# Patient Record
Sex: FEMALE | Race: WHITE | URBAN - METROPOLITAN AREA
[De-identification: names, ages, dates, MRNs, and addresses within clinical notes are randomized per-mention and may not be internally consistent; named-entity substitution may affect disease eponyms.]

---

## 2020-06-01 ENCOUNTER — OFFICE VISIT (OUTPATIENT)
Dept: URBAN - METROPOLITAN AREA CLINIC 79 | Facility: CLINIC | Age: 60
End: 2020-06-01
Payer: COMMERCIAL

## 2020-06-01 DIAGNOSIS — K50.80 CROHN'S DISEASE OF BOTH SMALL AND LARGE INTESTINE: ICD-10-CM

## 2020-06-01 PROCEDURE — 96413 CHEMO IV INFUSION 1 HR: CPT | Performed by: INTERNAL MEDICINE

## 2020-06-01 PROCEDURE — 96415 CHEMO IV INFUSION ADDL HR: CPT | Performed by: INTERNAL MEDICINE

## 2020-06-01 RX ORDER — AMITRIPTYLINE HYDROCHLORIDE 25 MG/1
TAKE 1 TABLET (25 MG) BY ORAL ROUTE ONCE DAILY AT BEDTIME TABLET, FILM COATED ORAL 1
Qty: 0 | Refills: 0 | Status: ACTIVE | COMMUNITY
Start: 1900-01-01 | End: 1900-01-01

## 2020-06-01 RX ORDER — RIZATRIPTAN BENZOATE 10 MG
PRN MIGRAINES TABLET ORAL
Qty: 0 | Refills: 0 | Status: ACTIVE | COMMUNITY
Start: 1900-01-01 | End: 1900-01-01

## 2020-06-01 RX ORDER — MESALAMINE 800 MG/1
TAKE 3 TABLETS BY ORAL ROUTE 2 TIMES A DAY FOR 90 DAYS TABLET, DELAYED RELEASE ORAL 2
Qty: 540 | Refills: 3 | Status: ACTIVE | COMMUNITY
Start: 2020-02-05 | End: 2021-01-30

## 2020-06-01 RX ORDER — FAMOTIDINE 40 MG/1
TAKE 1 TABLET (40 MG) BY ORAL ROUTE 2 TIMES PER DAY FOR 90 DAYS TABLET ORAL 2
Qty: 180 | Refills: 3 | Status: ACTIVE | COMMUNITY
Start: 2019-11-10 | End: 2020-11-04

## 2020-06-01 RX ORDER — LEVOTHYROXINE SODIUM 0.05 MG/1
TABLET ORAL
Qty: 0 | Refills: 0 | Status: ACTIVE | COMMUNITY
Start: 1900-01-01 | End: 1900-01-01

## 2020-06-01 RX ORDER — METOPROLOL SUCCINATE 50 MG/1
TAKE 1 TABLET (50 MG) BY ORAL ROUTE ONCE DAILY TABLET, EXTENDED RELEASE ORAL 1
Qty: 0 | Refills: 0 | Status: ACTIVE | COMMUNITY
Start: 1900-01-01 | End: 1900-01-01

## 2020-06-01 RX ORDER — COLESEVELAM HYDROCHLORIDE 625 MG/1
TAKE 3 TABLETS (1,875 MG) BY ORAL ROUTE 2 TIMES PER DAY WITH MEALS AND LIQUID FOR 90 DAYS TABLET, FILM COATED ORAL 2
Qty: 540 | Refills: 3 | Status: ACTIVE | COMMUNITY
Start: 2020-02-05 | End: 2021-01-30

## 2020-07-13 ENCOUNTER — OFFICE VISIT (OUTPATIENT)
Dept: URBAN - METROPOLITAN AREA CLINIC 79 | Facility: CLINIC | Age: 60
End: 2020-07-13
Payer: COMMERCIAL

## 2020-07-13 DIAGNOSIS — K50.80 CROHN'S COLITIS: ICD-10-CM

## 2020-07-13 PROCEDURE — 96413 CHEMO IV INFUSION 1 HR: CPT | Performed by: INTERNAL MEDICINE

## 2020-07-13 PROCEDURE — 96415 CHEMO IV INFUSION ADDL HR: CPT | Performed by: INTERNAL MEDICINE

## 2020-07-13 RX ORDER — RIZATRIPTAN BENZOATE 10 MG
PRN MIGRAINES TABLET ORAL
Qty: 0 | Refills: 0 | Status: ACTIVE | COMMUNITY
Start: 1900-01-01 | End: 1900-01-01

## 2020-07-13 RX ORDER — AMITRIPTYLINE HYDROCHLORIDE 25 MG/1
TAKE 1 TABLET (25 MG) BY ORAL ROUTE ONCE DAILY AT BEDTIME TABLET, FILM COATED ORAL 1
Qty: 0 | Refills: 0 | Status: ACTIVE | COMMUNITY
Start: 1900-01-01 | End: 1900-01-01

## 2020-07-13 RX ORDER — COLESEVELAM HYDROCHLORIDE 625 MG/1
TAKE 3 TABLETS (1,875 MG) BY ORAL ROUTE 2 TIMES PER DAY WITH MEALS AND LIQUID FOR 90 DAYS TABLET, FILM COATED ORAL 2
Qty: 540 | Refills: 3 | Status: ACTIVE | COMMUNITY
Start: 2020-02-05 | End: 2021-01-30

## 2020-07-13 RX ORDER — LEVOTHYROXINE SODIUM 0.05 MG/1
TABLET ORAL
Qty: 0 | Refills: 0 | Status: ACTIVE | COMMUNITY
Start: 1900-01-01 | End: 1900-01-01

## 2020-07-13 RX ORDER — FAMOTIDINE 40 MG/1
TAKE 1 TABLET (40 MG) BY ORAL ROUTE 2 TIMES PER DAY FOR 90 DAYS TABLET ORAL 2
Qty: 180 | Refills: 3 | Status: ACTIVE | COMMUNITY
Start: 2019-11-10 | End: 2020-11-04

## 2020-07-13 RX ORDER — MESALAMINE 800 MG/1
TAKE 3 TABLETS BY ORAL ROUTE 2 TIMES A DAY FOR 90 DAYS TABLET, DELAYED RELEASE ORAL 2
Qty: 540 | Refills: 3 | Status: ACTIVE | COMMUNITY
Start: 2020-02-05 | End: 2021-01-30

## 2020-07-13 RX ORDER — METOPROLOL SUCCINATE 50 MG/1
TAKE 1 TABLET (50 MG) BY ORAL ROUTE ONCE DAILY TABLET, EXTENDED RELEASE ORAL 1
Qty: 0 | Refills: 0 | Status: ACTIVE | COMMUNITY
Start: 1900-01-01 | End: 1900-01-01

## 2020-08-04 ENCOUNTER — OFFICE VISIT (OUTPATIENT)
Dept: URBAN - METROPOLITAN AREA CLINIC 96 | Facility: CLINIC | Age: 60
End: 2020-08-04

## 2020-08-07 ENCOUNTER — TELEPHONE ENCOUNTER (OUTPATIENT)
Dept: URBAN - METROPOLITAN AREA CLINIC 92 | Facility: CLINIC | Age: 60
End: 2020-08-07

## 2020-08-07 ENCOUNTER — OFFICE VISIT (OUTPATIENT)
Dept: URBAN - METROPOLITAN AREA TELEHEALTH 2 | Facility: TELEHEALTH | Age: 60
End: 2020-08-07
Payer: COMMERCIAL

## 2020-08-07 DIAGNOSIS — Z78.9 DRUG INTOLERANCE: ICD-10-CM

## 2020-08-07 DIAGNOSIS — K50.90 CROHNS DISEASE: ICD-10-CM

## 2020-08-07 DIAGNOSIS — Z87.442 HISTORY OF KIDNEY STONES: ICD-10-CM

## 2020-08-07 DIAGNOSIS — K50.80 CROHN'S COLITIS: ICD-10-CM

## 2020-08-07 DIAGNOSIS — Z86.39 HISTORY OF NON ANEMIC VITAMIN B12 DEFICIENCY: ICD-10-CM

## 2020-08-07 DIAGNOSIS — K57.90 DIVERTICULOSIS: ICD-10-CM

## 2020-08-07 DIAGNOSIS — K21.9 GERD (GASTROESOPHAGEAL REFLUX DISEASE): ICD-10-CM

## 2020-08-07 DIAGNOSIS — K90.89 BILE SALT-INDUCED DIARRHEA: ICD-10-CM

## 2020-08-07 DIAGNOSIS — M85.80 OSTEOPENIA: ICD-10-CM

## 2020-08-07 DIAGNOSIS — Z83.79 FAMILY HISTORY OF ULCERATIVE COLITIS: ICD-10-CM

## 2020-08-07 DIAGNOSIS — Z86.010 HISTORY OF ADENOMATOUS POLYP OF COLON: ICD-10-CM

## 2020-08-07 DIAGNOSIS — N18.3 CKD (CHRONIC KIDNEY DISEASE) STAGE 3, GFR 30-59 ML/MIN: ICD-10-CM

## 2020-08-07 PROCEDURE — 1036F TOBACCO NON-USER: CPT | Performed by: INTERNAL MEDICINE

## 2020-08-07 PROCEDURE — G8938 BMI DOC ONL FUP NT DOC: HCPCS | Performed by: INTERNAL MEDICINE

## 2020-08-07 PROCEDURE — G8427 DOCREV CUR MEDS BY ELIG CLIN: HCPCS | Performed by: INTERNAL MEDICINE

## 2020-08-07 PROCEDURE — 3017F COLORECTAL CA SCREEN DOC REV: CPT | Performed by: INTERNAL MEDICINE

## 2020-08-07 PROCEDURE — G9903 PT SCRN TBCO ID AS NON USER: HCPCS | Performed by: INTERNAL MEDICINE

## 2020-08-07 PROCEDURE — 99212 OFFICE O/P EST SF 10 MIN: CPT | Performed by: INTERNAL MEDICINE

## 2020-08-07 RX ORDER — LEVOTHYROXINE SODIUM 0.05 MG/1
TABLET ORAL
Qty: 0 | Refills: 0 | Status: ACTIVE | COMMUNITY
Start: 1900-01-01

## 2020-08-07 RX ORDER — RIZATRIPTAN BENZOATE 10 MG
PRN MIGRAINES TABLET ORAL
Qty: 0 | Refills: 0 | Status: ACTIVE | COMMUNITY
Start: 1900-01-01

## 2020-08-07 RX ORDER — MESALAMINE 800 MG/1
3 TABLETS TABLET, DELAYED RELEASE ORAL BID
Qty: 540 TABLET | Refills: 3 | OUTPATIENT
Start: 2020-08-07 | End: 2021-08-02

## 2020-08-07 RX ORDER — AMITRIPTYLINE HYDROCHLORIDE 25 MG/1
TAKE 1 TABLET (25 MG) BY ORAL ROUTE ONCE DAILY AT BEDTIME TABLET, FILM COATED ORAL 1
Qty: 0 | Refills: 0 | Status: ACTIVE | COMMUNITY
Start: 1900-01-01

## 2020-08-07 RX ORDER — MESALAMINE 800 MG/1
TAKE 3 TABLETS BY ORAL ROUTE 2 TIMES A DAY FOR 90 DAYS TABLET, DELAYED RELEASE ORAL 2
Qty: 540 | Refills: 3 | Status: ACTIVE | COMMUNITY
Start: 2020-02-05 | End: 2021-01-30

## 2020-08-07 RX ORDER — METOPROLOL SUCCINATE 50 MG/1
TAKE 1 TABLET (50 MG) BY ORAL ROUTE ONCE DAILY TABLET, EXTENDED RELEASE ORAL 1
Qty: 0 | Refills: 0 | Status: ACTIVE | COMMUNITY
Start: 1900-01-01

## 2020-08-07 RX ORDER — FAMOTIDINE 40 MG/1
TAKE 1 TABLET (40 MG) BY ORAL ROUTE 2 TIMES PER DAY FOR 90 DAYS TABLET ORAL 2
Qty: 180 | Refills: 3 | Status: ACTIVE | COMMUNITY
Start: 2019-11-10 | End: 2020-11-04

## 2020-08-07 RX ORDER — FAMOTIDINE 40 MG/1
1 TABLET AS NEEDED TABLET, FILM COATED ORAL TWICE A DAY
Qty: 180 TABLET | Refills: 3 | OUTPATIENT
Start: 2020-08-07

## 2020-08-07 NOTE — HPI-TODAY'S VISIT:
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 feels great. did note after d/cing xs sugar free foods, has not req her  welchol.

## 2020-08-24 ENCOUNTER — OFFICE VISIT (OUTPATIENT)
Dept: URBAN - METROPOLITAN AREA CLINIC 79 | Facility: CLINIC | Age: 60
End: 2020-08-24
Payer: COMMERCIAL

## 2020-08-24 VITALS
HEART RATE: 98 BPM | DIASTOLIC BLOOD PRESSURE: 87 MMHG | WEIGHT: 170.6 LBS | BODY MASS INDEX: 33.49 KG/M2 | RESPIRATION RATE: 17 BRPM | HEIGHT: 60 IN | TEMPERATURE: 98.6 F | SYSTOLIC BLOOD PRESSURE: 109 MMHG

## 2020-08-24 DIAGNOSIS — K50.80 CROHN'S COLITIS: ICD-10-CM

## 2020-08-24 PROCEDURE — 96413 CHEMO IV INFUSION 1 HR: CPT | Performed by: INTERNAL MEDICINE

## 2020-08-24 PROCEDURE — 96415 CHEMO IV INFUSION ADDL HR: CPT | Performed by: INTERNAL MEDICINE

## 2020-08-24 RX ORDER — MESALAMINE 800 MG/1
TAKE 3 TABLETS BY ORAL ROUTE 2 TIMES A DAY FOR 90 DAYS TABLET, DELAYED RELEASE ORAL 2
Qty: 540 | Refills: 3 | Status: ACTIVE | COMMUNITY
Start: 2020-02-05 | End: 2021-01-30

## 2020-08-24 RX ORDER — AMITRIPTYLINE HYDROCHLORIDE 25 MG/1
TAKE 1 TABLET (25 MG) BY ORAL ROUTE ONCE DAILY AT BEDTIME TABLET, FILM COATED ORAL 1
Qty: 0 | Refills: 0 | Status: ACTIVE | COMMUNITY
Start: 1900-01-01

## 2020-08-24 RX ORDER — LEVOTHYROXINE SODIUM 0.05 MG/1
TABLET ORAL
Qty: 0 | Refills: 0 | Status: ACTIVE | COMMUNITY
Start: 1900-01-01

## 2020-08-24 RX ORDER — FAMOTIDINE 40 MG/1
1 TABLET AS NEEDED TABLET, FILM COATED ORAL TWICE A DAY
Qty: 180 TABLET | Refills: 3 | Status: ACTIVE | COMMUNITY
Start: 2020-08-07

## 2020-08-24 RX ORDER — METOPROLOL SUCCINATE 50 MG/1
TAKE 1 TABLET (50 MG) BY ORAL ROUTE ONCE DAILY TABLET, EXTENDED RELEASE ORAL 1
Qty: 0 | Refills: 0 | Status: ACTIVE | COMMUNITY
Start: 1900-01-01

## 2020-08-24 RX ORDER — RIZATRIPTAN BENZOATE 10 MG
PRN MIGRAINES TABLET ORAL
Qty: 0 | Refills: 0 | Status: ACTIVE | COMMUNITY
Start: 1900-01-01

## 2020-08-24 RX ORDER — MESALAMINE 800 MG/1
3 TABLETS TABLET, DELAYED RELEASE ORAL BID
Qty: 540 TABLET | Refills: 3 | Status: ACTIVE | COMMUNITY
Start: 2020-08-07 | End: 2021-08-02

## 2020-08-24 RX ORDER — FAMOTIDINE 40 MG/1
TAKE 1 TABLET (40 MG) BY ORAL ROUTE 2 TIMES PER DAY FOR 90 DAYS TABLET ORAL 2
Qty: 180 | Refills: 3 | Status: ACTIVE | COMMUNITY
Start: 2019-11-10 | End: 2020-11-04

## 2020-10-05 ENCOUNTER — OFFICE VISIT (OUTPATIENT)
Dept: URBAN - METROPOLITAN AREA CLINIC 79 | Facility: CLINIC | Age: 60
End: 2020-10-05
Payer: COMMERCIAL

## 2020-10-05 VITALS
WEIGHT: 173 LBS | RESPIRATION RATE: 18 BRPM | SYSTOLIC BLOOD PRESSURE: 102 MMHG | DIASTOLIC BLOOD PRESSURE: 69 MMHG | HEART RATE: 85 BPM | TEMPERATURE: 98.2 F | HEIGHT: 60 IN | BODY MASS INDEX: 33.96 KG/M2

## 2020-10-05 DIAGNOSIS — K50.80 CROHN'S COLITIS: ICD-10-CM

## 2020-10-05 PROCEDURE — 96415 CHEMO IV INFUSION ADDL HR: CPT | Performed by: INTERNAL MEDICINE

## 2020-10-05 PROCEDURE — 96413 CHEMO IV INFUSION 1 HR: CPT | Performed by: INTERNAL MEDICINE

## 2020-10-05 RX ORDER — MESALAMINE 800 MG/1
3 TABLETS TABLET, DELAYED RELEASE ORAL BID
Qty: 540 TABLET | Refills: 3 | Status: ACTIVE | COMMUNITY
Start: 2020-08-07 | End: 2021-08-02

## 2020-10-05 RX ORDER — METOPROLOL SUCCINATE 50 MG/1
TAKE 1 TABLET (50 MG) BY ORAL ROUTE ONCE DAILY TABLET, EXTENDED RELEASE ORAL 1
Qty: 0 | Refills: 0 | Status: ACTIVE | COMMUNITY
Start: 1900-01-01

## 2020-10-05 RX ORDER — FAMOTIDINE 40 MG/1
1 TABLET AS NEEDED TABLET, FILM COATED ORAL TWICE A DAY
Qty: 180 TABLET | Refills: 3 | Status: ACTIVE | COMMUNITY
Start: 2020-08-07

## 2020-10-05 RX ORDER — FAMOTIDINE 40 MG/1
TAKE 1 TABLET (40 MG) BY ORAL ROUTE 2 TIMES PER DAY FOR 90 DAYS TABLET ORAL 2
Qty: 180 | Refills: 3 | Status: ACTIVE | COMMUNITY
Start: 2019-11-10 | End: 2020-11-04

## 2020-10-05 RX ORDER — MESALAMINE 800 MG/1
TAKE 3 TABLETS BY ORAL ROUTE 2 TIMES A DAY FOR 90 DAYS TABLET, DELAYED RELEASE ORAL 2
Qty: 540 | Refills: 3 | Status: ACTIVE | COMMUNITY
Start: 2020-02-05 | End: 2021-01-30

## 2020-10-05 RX ORDER — RIZATRIPTAN BENZOATE 10 MG
PRN MIGRAINES TABLET ORAL
Qty: 0 | Refills: 0 | Status: ACTIVE | COMMUNITY
Start: 1900-01-01

## 2020-10-05 RX ORDER — LEVOTHYROXINE SODIUM 0.05 MG/1
TABLET ORAL
Qty: 0 | Refills: 0 | Status: ACTIVE | COMMUNITY
Start: 1900-01-01

## 2020-10-05 RX ORDER — AMITRIPTYLINE HYDROCHLORIDE 25 MG/1
TAKE 1 TABLET (25 MG) BY ORAL ROUTE ONCE DAILY AT BEDTIME TABLET, FILM COATED ORAL 1
Qty: 0 | Refills: 0 | Status: ACTIVE | COMMUNITY
Start: 1900-01-01

## 2020-11-02 ENCOUNTER — TELEPHONE ENCOUNTER (OUTPATIENT)
Dept: URBAN - METROPOLITAN AREA CLINIC 92 | Facility: CLINIC | Age: 60
End: 2020-11-02

## 2020-11-09 ENCOUNTER — LAB OUTSIDE AN ENCOUNTER (OUTPATIENT)
Dept: URBAN - METROPOLITAN AREA CLINIC 92 | Facility: CLINIC | Age: 60
End: 2020-11-09

## 2020-11-16 ENCOUNTER — OFFICE VISIT (OUTPATIENT)
Dept: URBAN - METROPOLITAN AREA CLINIC 79 | Facility: CLINIC | Age: 60
End: 2020-11-16
Payer: COMMERCIAL

## 2020-11-16 VITALS
HEIGHT: 60 IN | SYSTOLIC BLOOD PRESSURE: 104 MMHG | BODY MASS INDEX: 33.77 KG/M2 | DIASTOLIC BLOOD PRESSURE: 70 MMHG | TEMPERATURE: 98.4 F | RESPIRATION RATE: 18 BRPM | WEIGHT: 172 LBS | HEART RATE: 78 BPM

## 2020-11-16 DIAGNOSIS — K50.80 CROHN'S COLITIS: ICD-10-CM

## 2020-11-16 PROCEDURE — 96413 CHEMO IV INFUSION 1 HR: CPT | Performed by: INTERNAL MEDICINE

## 2020-11-16 PROCEDURE — 96415 CHEMO IV INFUSION ADDL HR: CPT | Performed by: INTERNAL MEDICINE

## 2020-11-16 RX ORDER — MESALAMINE 800 MG/1
TAKE 3 TABLETS BY ORAL ROUTE 2 TIMES A DAY FOR 90 DAYS TABLET, DELAYED RELEASE ORAL 2
Qty: 540 | Refills: 3 | Status: ACTIVE | COMMUNITY
Start: 2020-02-05 | End: 2021-01-30

## 2020-11-16 RX ORDER — AMITRIPTYLINE HYDROCHLORIDE 25 MG/1
TAKE 1 TABLET (25 MG) BY ORAL ROUTE ONCE DAILY AT BEDTIME TABLET, FILM COATED ORAL 1
Qty: 0 | Refills: 0 | Status: ACTIVE | COMMUNITY
Start: 1900-01-01

## 2020-11-16 RX ORDER — MESALAMINE 800 MG/1
3 TABLETS TABLET, DELAYED RELEASE ORAL BID
Qty: 540 TABLET | Refills: 3 | Status: ACTIVE | COMMUNITY
Start: 2020-08-07 | End: 2021-08-02

## 2020-11-16 RX ORDER — FAMOTIDINE 40 MG/1
1 TABLET AS NEEDED TABLET, FILM COATED ORAL TWICE A DAY
Qty: 180 TABLET | Refills: 3 | Status: ACTIVE | COMMUNITY
Start: 2020-08-07

## 2020-11-16 RX ORDER — METOPROLOL SUCCINATE 50 MG/1
TAKE 1 TABLET (50 MG) BY ORAL ROUTE ONCE DAILY TABLET, EXTENDED RELEASE ORAL 1
Qty: 0 | Refills: 0 | Status: ACTIVE | COMMUNITY
Start: 1900-01-01

## 2020-11-16 RX ORDER — RIZATRIPTAN BENZOATE 10 MG
PRN MIGRAINES TABLET ORAL
Qty: 0 | Refills: 0 | Status: ACTIVE | COMMUNITY
Start: 1900-01-01

## 2020-11-16 RX ORDER — LEVOTHYROXINE SODIUM 0.05 MG/1
TABLET ORAL
Qty: 0 | Refills: 0 | Status: ACTIVE | COMMUNITY
Start: 1900-01-01

## 2020-11-17 ENCOUNTER — TELEPHONE ENCOUNTER (OUTPATIENT)
Dept: URBAN - METROPOLITAN AREA CLINIC 92 | Facility: CLINIC | Age: 60
End: 2020-11-17

## 2020-11-30 ENCOUNTER — WEB ENCOUNTER (OUTPATIENT)
Dept: URBAN - METROPOLITAN AREA CLINIC 96 | Facility: CLINIC | Age: 60
End: 2020-11-30

## 2020-12-08 ENCOUNTER — TELEPHONE ENCOUNTER (OUTPATIENT)
Dept: URBAN - METROPOLITAN AREA CLINIC 96 | Facility: CLINIC | Age: 60
End: 2020-12-08

## 2020-12-08 RX ORDER — SODIUM, POTASSIUM,MAG SULFATES 17.5-3.13G
354 ML SOLUTION, RECONSTITUTED, ORAL ORAL
Qty: 354 ML | Refills: 0 | OUTPATIENT
Start: 2020-12-15 | End: 2020-12-16

## 2020-12-28 ENCOUNTER — OFFICE VISIT (OUTPATIENT)
Dept: URBAN - METROPOLITAN AREA CLINIC 79 | Facility: CLINIC | Age: 60
End: 2020-12-28
Payer: COMMERCIAL

## 2020-12-28 VITALS
WEIGHT: 175 LBS | SYSTOLIC BLOOD PRESSURE: 98 MMHG | HEIGHT: 60 IN | TEMPERATURE: 98.1 F | HEART RATE: 87 BPM | DIASTOLIC BLOOD PRESSURE: 60 MMHG | BODY MASS INDEX: 34.36 KG/M2 | RESPIRATION RATE: 18 BRPM

## 2020-12-28 DIAGNOSIS — K50.80 CROHN'S COLITIS: ICD-10-CM

## 2020-12-28 PROCEDURE — 96413 CHEMO IV INFUSION 1 HR: CPT | Performed by: INTERNAL MEDICINE

## 2020-12-28 PROCEDURE — 96415 CHEMO IV INFUSION ADDL HR: CPT | Performed by: INTERNAL MEDICINE

## 2020-12-28 RX ORDER — MESALAMINE 800 MG/1
TAKE 3 TABLETS BY ORAL ROUTE 2 TIMES A DAY FOR 90 DAYS TABLET, DELAYED RELEASE ORAL 2
Qty: 540 | Refills: 3 | Status: ACTIVE | COMMUNITY
Start: 2020-02-05 | End: 2021-01-30

## 2020-12-28 RX ORDER — AMITRIPTYLINE HYDROCHLORIDE 25 MG/1
TAKE 1 TABLET (25 MG) BY ORAL ROUTE ONCE DAILY AT BEDTIME TABLET, FILM COATED ORAL 1
Qty: 0 | Refills: 0 | Status: ACTIVE | COMMUNITY
Start: 1900-01-01

## 2020-12-28 RX ORDER — RIZATRIPTAN BENZOATE 10 MG
PRN MIGRAINES TABLET ORAL
Qty: 0 | Refills: 0 | Status: ACTIVE | COMMUNITY
Start: 1900-01-01

## 2020-12-28 RX ORDER — LEVOTHYROXINE SODIUM 0.05 MG/1
TABLET ORAL
Qty: 0 | Refills: 0 | Status: ACTIVE | COMMUNITY
Start: 1900-01-01

## 2020-12-28 RX ORDER — MESALAMINE 800 MG/1
3 TABLETS TABLET, DELAYED RELEASE ORAL BID
Qty: 540 TABLET | Refills: 3 | Status: ACTIVE | COMMUNITY
Start: 2020-08-07 | End: 2021-08-02

## 2020-12-28 RX ORDER — METOPROLOL SUCCINATE 50 MG/1
TAKE 1 TABLET (50 MG) BY ORAL ROUTE ONCE DAILY TABLET, EXTENDED RELEASE ORAL 1
Qty: 0 | Refills: 0 | Status: ACTIVE | COMMUNITY
Start: 1900-01-01

## 2020-12-28 RX ORDER — FAMOTIDINE 40 MG/1
1 TABLET AS NEEDED TABLET, FILM COATED ORAL TWICE A DAY
Qty: 180 TABLET | Refills: 3 | Status: ACTIVE | COMMUNITY
Start: 2020-08-07

## 2021-02-08 ENCOUNTER — OFFICE VISIT (OUTPATIENT)
Dept: URBAN - METROPOLITAN AREA CLINIC 79 | Facility: CLINIC | Age: 61
End: 2021-02-08
Payer: COMMERCIAL

## 2021-02-08 VITALS
HEART RATE: 85 BPM | WEIGHT: 175 LBS | HEIGHT: 60 IN | SYSTOLIC BLOOD PRESSURE: 117 MMHG | DIASTOLIC BLOOD PRESSURE: 70 MMHG | TEMPERATURE: 97.9 F | BODY MASS INDEX: 34.36 KG/M2 | RESPIRATION RATE: 18 BRPM

## 2021-02-08 DIAGNOSIS — K50.80 CROHN'S COLITIS: ICD-10-CM

## 2021-02-08 PROCEDURE — 96415 CHEMO IV INFUSION ADDL HR: CPT | Performed by: INTERNAL MEDICINE

## 2021-02-08 PROCEDURE — 96413 CHEMO IV INFUSION 1 HR: CPT | Performed by: INTERNAL MEDICINE

## 2021-02-08 RX ORDER — FAMOTIDINE 40 MG/1
1 TABLET AS NEEDED TABLET, FILM COATED ORAL TWICE A DAY
Qty: 180 TABLET | Refills: 3 | Status: ACTIVE | COMMUNITY
Start: 2020-08-07

## 2021-02-08 RX ORDER — METOPROLOL SUCCINATE 50 MG/1
TAKE 1 TABLET (50 MG) BY ORAL ROUTE ONCE DAILY TABLET, EXTENDED RELEASE ORAL 1
Qty: 0 | Refills: 0 | Status: ACTIVE | COMMUNITY
Start: 1900-01-01

## 2021-02-08 RX ORDER — LEVOTHYROXINE SODIUM 0.05 MG/1
TABLET ORAL
Qty: 0 | Refills: 0 | Status: ACTIVE | COMMUNITY
Start: 1900-01-01

## 2021-02-08 RX ORDER — MESALAMINE 800 MG/1
3 TABLETS TABLET, DELAYED RELEASE ORAL BID
Qty: 540 TABLET | Refills: 3 | Status: ACTIVE | COMMUNITY
Start: 2020-08-07 | End: 2021-08-02

## 2021-02-08 RX ORDER — RIZATRIPTAN BENZOATE 10 MG
PRN MIGRAINES TABLET ORAL
Qty: 0 | Refills: 0 | Status: ACTIVE | COMMUNITY
Start: 1900-01-01

## 2021-02-08 RX ORDER — AMITRIPTYLINE HYDROCHLORIDE 25 MG/1
TAKE 1 TABLET (25 MG) BY ORAL ROUTE ONCE DAILY AT BEDTIME TABLET, FILM COATED ORAL 1
Qty: 0 | Refills: 0 | Status: ACTIVE | COMMUNITY
Start: 1900-01-01

## 2021-02-15 ENCOUNTER — CLAIMS CREATED FROM THE CLAIM WINDOW (OUTPATIENT)
Dept: URBAN - METROPOLITAN AREA CLINIC 4 | Facility: CLINIC | Age: 61
End: 2021-02-15
Payer: COMMERCIAL

## 2021-02-15 ENCOUNTER — OFFICE VISIT (OUTPATIENT)
Dept: URBAN - METROPOLITAN AREA SURGERY CENTER 18 | Facility: SURGERY CENTER | Age: 61
End: 2021-02-15
Payer: COMMERCIAL

## 2021-02-15 DIAGNOSIS — K21.00 GASTRO-ESOPHAGEAL REFLUX DISEASE WITH ESOPHAGITIS, WITHOUT BLEEDING: ICD-10-CM

## 2021-02-15 DIAGNOSIS — K21.9 ACID REFLUX: ICD-10-CM

## 2021-02-15 DIAGNOSIS — K31.7 BENIGN GASTRIC POLYP: ICD-10-CM

## 2021-02-15 DIAGNOSIS — K50.80 CROHN'S COLITIS: ICD-10-CM

## 2021-02-15 PROCEDURE — G8907 PT DOC NO EVENTS ON DISCHARG: HCPCS | Performed by: INTERNAL MEDICINE

## 2021-02-15 PROCEDURE — 88305 TISSUE EXAM BY PATHOLOGIST: CPT | Performed by: PATHOLOGY

## 2021-02-15 PROCEDURE — 88312 SPECIAL STAINS GROUP 1: CPT | Performed by: PATHOLOGY

## 2021-02-15 PROCEDURE — 43239 EGD BIOPSY SINGLE/MULTIPLE: CPT | Performed by: INTERNAL MEDICINE

## 2021-02-15 PROCEDURE — 45378 DIAGNOSTIC COLONOSCOPY: CPT | Performed by: INTERNAL MEDICINE

## 2021-02-15 RX ORDER — RIZATRIPTAN BENZOATE 10 MG
PRN MIGRAINES TABLET ORAL
Qty: 0 | Refills: 0 | Status: ACTIVE | COMMUNITY
Start: 1900-01-01

## 2021-02-15 RX ORDER — MESALAMINE 800 MG/1
3 TABLETS TABLET, DELAYED RELEASE ORAL BID
Qty: 540 TABLET | Refills: 3 | Status: ACTIVE | COMMUNITY
Start: 2020-08-07 | End: 2021-08-02

## 2021-02-15 RX ORDER — METOPROLOL SUCCINATE 50 MG/1
TAKE 1 TABLET (50 MG) BY ORAL ROUTE ONCE DAILY TABLET, EXTENDED RELEASE ORAL 1
Qty: 0 | Refills: 0 | Status: ACTIVE | COMMUNITY
Start: 1900-01-01

## 2021-02-15 RX ORDER — LEVOTHYROXINE SODIUM 0.05 MG/1
TABLET ORAL
Qty: 0 | Refills: 0 | Status: ACTIVE | COMMUNITY
Start: 1900-01-01

## 2021-02-15 RX ORDER — AMITRIPTYLINE HYDROCHLORIDE 25 MG/1
TAKE 1 TABLET (25 MG) BY ORAL ROUTE ONCE DAILY AT BEDTIME TABLET, FILM COATED ORAL 1
Qty: 0 | Refills: 0 | Status: ACTIVE | COMMUNITY
Start: 1900-01-01

## 2021-02-15 RX ORDER — FAMOTIDINE 40 MG/1
1 TABLET AS NEEDED TABLET, FILM COATED ORAL TWICE A DAY
Qty: 180 TABLET | Refills: 3 | Status: ACTIVE | COMMUNITY
Start: 2020-08-07

## 2021-04-01 ENCOUNTER — OFFICE VISIT (OUTPATIENT)
Dept: URBAN - METROPOLITAN AREA CLINIC 79 | Facility: CLINIC | Age: 61
End: 2021-04-01
Payer: COMMERCIAL

## 2021-04-01 VITALS
DIASTOLIC BLOOD PRESSURE: 74 MMHG | TEMPERATURE: 98.1 F | HEIGHT: 60 IN | HEART RATE: 94 BPM | WEIGHT: 174 LBS | SYSTOLIC BLOOD PRESSURE: 113 MMHG | BODY MASS INDEX: 34.16 KG/M2 | RESPIRATION RATE: 20 BRPM

## 2021-04-01 DIAGNOSIS — K50.80 CROHN'S COLITIS: ICD-10-CM

## 2021-04-01 PROCEDURE — 96415 CHEMO IV INFUSION ADDL HR: CPT | Performed by: INTERNAL MEDICINE

## 2021-04-01 PROCEDURE — 96413 CHEMO IV INFUSION 1 HR: CPT | Performed by: INTERNAL MEDICINE

## 2021-04-01 RX ORDER — LEVOTHYROXINE SODIUM 0.05 MG/1
TABLET ORAL
Qty: 0 | Refills: 0 | Status: ACTIVE | COMMUNITY
Start: 1900-01-01

## 2021-04-01 RX ORDER — METOPROLOL SUCCINATE 50 MG/1
TAKE 1 TABLET (50 MG) BY ORAL ROUTE ONCE DAILY TABLET, EXTENDED RELEASE ORAL 1
Qty: 0 | Refills: 0 | Status: ACTIVE | COMMUNITY
Start: 1900-01-01

## 2021-04-01 RX ORDER — MESALAMINE 800 MG/1
3 TABLETS TABLET, DELAYED RELEASE ORAL BID
Qty: 540 TABLET | Refills: 3 | Status: ACTIVE | COMMUNITY
Start: 2020-08-07 | End: 2021-08-02

## 2021-04-01 RX ORDER — RIZATRIPTAN BENZOATE 10 MG
PRN MIGRAINES TABLET ORAL
Qty: 0 | Refills: 0 | Status: ACTIVE | COMMUNITY
Start: 1900-01-01

## 2021-04-01 RX ORDER — FAMOTIDINE 40 MG/1
1 TABLET AS NEEDED TABLET, FILM COATED ORAL TWICE A DAY
Qty: 180 TABLET | Refills: 3 | Status: ACTIVE | COMMUNITY
Start: 2020-08-07

## 2021-04-01 RX ORDER — AMITRIPTYLINE HYDROCHLORIDE 25 MG/1
TAKE 1 TABLET (25 MG) BY ORAL ROUTE ONCE DAILY AT BEDTIME TABLET, FILM COATED ORAL 1
Qty: 0 | Refills: 0 | Status: ACTIVE | COMMUNITY
Start: 1900-01-01

## 2021-05-13 ENCOUNTER — OFFICE VISIT (OUTPATIENT)
Dept: URBAN - METROPOLITAN AREA CLINIC 79 | Facility: CLINIC | Age: 61
End: 2021-05-13
Payer: COMMERCIAL

## 2021-05-13 VITALS
HEIGHT: 60 IN | SYSTOLIC BLOOD PRESSURE: 112 MMHG | BODY MASS INDEX: 34.95 KG/M2 | DIASTOLIC BLOOD PRESSURE: 74 MMHG | HEART RATE: 87 BPM | RESPIRATION RATE: 20 BRPM | WEIGHT: 178 LBS | TEMPERATURE: 97.9 F

## 2021-05-13 DIAGNOSIS — K50.80 CROHN'S COLITIS: ICD-10-CM

## 2021-05-13 PROCEDURE — 96413 CHEMO IV INFUSION 1 HR: CPT | Performed by: INTERNAL MEDICINE

## 2021-05-13 PROCEDURE — 96415 CHEMO IV INFUSION ADDL HR: CPT | Performed by: INTERNAL MEDICINE

## 2021-05-13 RX ORDER — LEVOTHYROXINE SODIUM 0.05 MG/1
TABLET ORAL
Qty: 0 | Refills: 0 | Status: ACTIVE | COMMUNITY
Start: 1900-01-01

## 2021-05-13 RX ORDER — FAMOTIDINE 40 MG/1
1 TABLET AS NEEDED TABLET, FILM COATED ORAL TWICE A DAY
Qty: 180 TABLET | Refills: 3 | Status: ACTIVE | COMMUNITY
Start: 2020-08-07

## 2021-05-13 RX ORDER — RIZATRIPTAN BENZOATE 10 MG
PRN MIGRAINES TABLET ORAL
Qty: 0 | Refills: 0 | Status: ACTIVE | COMMUNITY
Start: 1900-01-01

## 2021-05-13 RX ORDER — METOPROLOL SUCCINATE 50 MG/1
TAKE 1 TABLET (50 MG) BY ORAL ROUTE ONCE DAILY TABLET, EXTENDED RELEASE ORAL 1
Qty: 0 | Refills: 0 | Status: ACTIVE | COMMUNITY
Start: 1900-01-01

## 2021-05-13 RX ORDER — MESALAMINE 800 MG/1
3 TABLETS TABLET, DELAYED RELEASE ORAL BID
Qty: 540 TABLET | Refills: 3 | Status: ACTIVE | COMMUNITY
Start: 2020-08-07 | End: 2021-08-02

## 2021-05-13 RX ORDER — AMITRIPTYLINE HYDROCHLORIDE 25 MG/1
TAKE 1 TABLET (25 MG) BY ORAL ROUTE ONCE DAILY AT BEDTIME TABLET, FILM COATED ORAL 1
Qty: 0 | Refills: 0 | Status: ACTIVE | COMMUNITY
Start: 1900-01-01

## 2021-06-24 ENCOUNTER — OFFICE VISIT (OUTPATIENT)
Dept: URBAN - METROPOLITAN AREA CLINIC 79 | Facility: CLINIC | Age: 61
End: 2021-06-24
Payer: COMMERCIAL

## 2021-06-24 VITALS
BODY MASS INDEX: 34.75 KG/M2 | HEIGHT: 60 IN | DIASTOLIC BLOOD PRESSURE: 74 MMHG | WEIGHT: 177 LBS | HEART RATE: 98 BPM | SYSTOLIC BLOOD PRESSURE: 120 MMHG | TEMPERATURE: 97.7 F | RESPIRATION RATE: 20 BRPM

## 2021-06-24 DIAGNOSIS — K50.80 CROHN'S COLITIS: ICD-10-CM

## 2021-06-24 PROCEDURE — 96413 CHEMO IV INFUSION 1 HR: CPT | Performed by: INTERNAL MEDICINE

## 2021-06-24 PROCEDURE — 96415 CHEMO IV INFUSION ADDL HR: CPT | Performed by: INTERNAL MEDICINE

## 2021-06-24 RX ORDER — RIZATRIPTAN BENZOATE 10 MG
PRN MIGRAINES TABLET ORAL
Qty: 0 | Refills: 0 | Status: ACTIVE | COMMUNITY
Start: 1900-01-01

## 2021-06-24 RX ORDER — AMITRIPTYLINE HYDROCHLORIDE 25 MG/1
TAKE 1 TABLET (25 MG) BY ORAL ROUTE ONCE DAILY AT BEDTIME TABLET, FILM COATED ORAL 1
Qty: 0 | Refills: 0 | Status: ACTIVE | COMMUNITY
Start: 1900-01-01

## 2021-06-24 RX ORDER — LEVOTHYROXINE SODIUM 0.05 MG/1
TABLET ORAL
Qty: 0 | Refills: 0 | Status: ACTIVE | COMMUNITY
Start: 1900-01-01

## 2021-06-24 RX ORDER — FAMOTIDINE 40 MG/1
1 TABLET AS NEEDED TABLET, FILM COATED ORAL TWICE A DAY
Qty: 180 TABLET | Refills: 3 | Status: ACTIVE | COMMUNITY
Start: 2020-08-07

## 2021-06-24 RX ORDER — MESALAMINE 800 MG/1
3 TABLETS TABLET, DELAYED RELEASE ORAL BID
Qty: 540 TABLET | Refills: 3 | Status: ACTIVE | COMMUNITY
Start: 2020-08-07 | End: 2021-08-02

## 2021-06-24 RX ORDER — METOPROLOL SUCCINATE 50 MG/1
TAKE 1 TABLET (50 MG) BY ORAL ROUTE ONCE DAILY TABLET, EXTENDED RELEASE ORAL 1
Qty: 0 | Refills: 0 | Status: ACTIVE | COMMUNITY
Start: 1900-01-01

## 2021-08-05 ENCOUNTER — OFFICE VISIT (OUTPATIENT)
Dept: URBAN - METROPOLITAN AREA CLINIC 79 | Facility: CLINIC | Age: 61
End: 2021-08-05
Payer: COMMERCIAL

## 2021-08-05 VITALS
WEIGHT: 179 LBS | RESPIRATION RATE: 18 BRPM | HEIGHT: 60 IN | SYSTOLIC BLOOD PRESSURE: 113 MMHG | BODY MASS INDEX: 35.14 KG/M2 | DIASTOLIC BLOOD PRESSURE: 73 MMHG | HEART RATE: 83 BPM | TEMPERATURE: 97.9 F

## 2021-08-05 DIAGNOSIS — K50.80 CROHN'S COLITIS: ICD-10-CM

## 2021-08-05 PROCEDURE — 96415 CHEMO IV INFUSION ADDL HR: CPT | Performed by: INTERNAL MEDICINE

## 2021-08-05 PROCEDURE — 96413 CHEMO IV INFUSION 1 HR: CPT | Performed by: INTERNAL MEDICINE

## 2021-08-05 RX ORDER — FAMOTIDINE 40 MG/1
1 TABLET AS NEEDED TABLET, FILM COATED ORAL TWICE A DAY
Qty: 180 TABLET | Refills: 3 | Status: ACTIVE | COMMUNITY
Start: 2020-08-07

## 2021-08-05 RX ORDER — AMITRIPTYLINE HYDROCHLORIDE 25 MG/1
TAKE 1 TABLET (25 MG) BY ORAL ROUTE ONCE DAILY AT BEDTIME TABLET, FILM COATED ORAL 1
Qty: 0 | Refills: 0 | Status: ACTIVE | COMMUNITY
Start: 1900-01-01

## 2021-08-05 RX ORDER — RIZATRIPTAN BENZOATE 10 MG
PRN MIGRAINES TABLET ORAL
Qty: 0 | Refills: 0 | Status: ACTIVE | COMMUNITY
Start: 1900-01-01

## 2021-08-05 RX ORDER — METOPROLOL SUCCINATE 50 MG/1
TAKE 1 TABLET (50 MG) BY ORAL ROUTE ONCE DAILY TABLET, EXTENDED RELEASE ORAL 1
Qty: 0 | Refills: 0 | Status: ACTIVE | COMMUNITY
Start: 1900-01-01

## 2021-08-05 RX ORDER — LEVOTHYROXINE SODIUM 0.05 MG/1
TABLET ORAL
Qty: 0 | Refills: 0 | Status: ACTIVE | COMMUNITY
Start: 1900-01-01

## 2021-08-26 ENCOUNTER — WEB ENCOUNTER (OUTPATIENT)
Dept: URBAN - METROPOLITAN AREA CLINIC 96 | Facility: CLINIC | Age: 61
End: 2021-08-26

## 2021-08-26 RX ORDER — MESALAMINE 800 MG/1
3 TABLETS TABLET, DELAYED RELEASE ORAL BID
Qty: 540 TABLET | Refills: 0 | OUTPATIENT
Start: 2021-08-28 | End: 2021-11-25

## 2021-08-30 ENCOUNTER — WEB ENCOUNTER (OUTPATIENT)
Dept: URBAN - METROPOLITAN AREA CLINIC 96 | Facility: CLINIC | Age: 61
End: 2021-08-30

## 2021-09-20 ENCOUNTER — WEB ENCOUNTER (OUTPATIENT)
Dept: URBAN - METROPOLITAN AREA CLINIC 96 | Facility: CLINIC | Age: 61
End: 2021-09-20

## 2021-09-20 RX ORDER — FAMOTIDINE 40 MG/1
1 TABLET AS NEEDED TABLET, FILM COATED ORAL TWICE A DAY
Qty: 180 TABLET | Refills: 3
Start: 2020-08-07

## 2021-09-21 ENCOUNTER — OFFICE VISIT (OUTPATIENT)
Dept: URBAN - METROPOLITAN AREA CLINIC 79 | Facility: CLINIC | Age: 61
End: 2021-09-21
Payer: COMMERCIAL

## 2021-09-21 VITALS
HEIGHT: 60 IN | BODY MASS INDEX: 35.34 KG/M2 | WEIGHT: 180 LBS | RESPIRATION RATE: 18 BRPM | SYSTOLIC BLOOD PRESSURE: 108 MMHG | DIASTOLIC BLOOD PRESSURE: 70 MMHG | HEART RATE: 102 BPM | TEMPERATURE: 97.5 F

## 2021-09-21 DIAGNOSIS — K50.80 CROHN'S COLITIS: ICD-10-CM

## 2021-09-21 PROCEDURE — 96413 CHEMO IV INFUSION 1 HR: CPT | Performed by: INTERNAL MEDICINE

## 2021-09-21 PROCEDURE — 96415 CHEMO IV INFUSION ADDL HR: CPT | Performed by: INTERNAL MEDICINE

## 2021-09-21 RX ORDER — METOPROLOL SUCCINATE 50 MG/1
TAKE 1 TABLET (50 MG) BY ORAL ROUTE ONCE DAILY TABLET, EXTENDED RELEASE ORAL 1
Qty: 0 | Refills: 0 | Status: ACTIVE | COMMUNITY
Start: 1900-01-01

## 2021-09-21 RX ORDER — LEVOTHYROXINE SODIUM 0.05 MG/1
TABLET ORAL
Qty: 0 | Refills: 0 | Status: ACTIVE | COMMUNITY
Start: 1900-01-01

## 2021-09-21 RX ORDER — FAMOTIDINE 40 MG/1
1 TABLET AS NEEDED TABLET, FILM COATED ORAL TWICE A DAY
Qty: 180 TABLET | Refills: 3 | Status: ACTIVE | COMMUNITY
Start: 2020-08-07

## 2021-09-21 RX ORDER — MESALAMINE 800 MG/1
3 TABLETS TABLET, DELAYED RELEASE ORAL BID
Qty: 540 TABLET | Refills: 0 | Status: ACTIVE | COMMUNITY
Start: 2021-08-28 | End: 2021-11-25

## 2021-09-21 RX ORDER — AMITRIPTYLINE HYDROCHLORIDE 25 MG/1
TAKE 1 TABLET (25 MG) BY ORAL ROUTE ONCE DAILY AT BEDTIME TABLET, FILM COATED ORAL 1
Qty: 0 | Refills: 0 | Status: ACTIVE | COMMUNITY
Start: 1900-01-01

## 2021-09-21 RX ORDER — RIZATRIPTAN BENZOATE 10 MG
PRN MIGRAINES TABLET ORAL
Qty: 0 | Refills: 0 | Status: ACTIVE | COMMUNITY
Start: 1900-01-01

## 2021-11-02 ENCOUNTER — TELEPHONE ENCOUNTER (OUTPATIENT)
Dept: URBAN - METROPOLITAN AREA CLINIC 98 | Facility: CLINIC | Age: 61
End: 2021-11-02

## 2021-11-02 ENCOUNTER — TELEPHONE ENCOUNTER (OUTPATIENT)
Dept: URBAN - METROPOLITAN AREA CLINIC 92 | Facility: CLINIC | Age: 61
End: 2021-11-02

## 2021-11-12 ENCOUNTER — OFFICE VISIT (OUTPATIENT)
Dept: URBAN - METROPOLITAN AREA CLINIC 79 | Facility: CLINIC | Age: 61
End: 2021-11-12
Payer: COMMERCIAL

## 2021-11-12 VITALS
HEART RATE: 97.9 BPM | TEMPERATURE: 97.9 F | WEIGHT: 182 LBS | BODY MASS INDEX: 35.73 KG/M2 | RESPIRATION RATE: 18 BRPM | DIASTOLIC BLOOD PRESSURE: 74 MMHG | HEIGHT: 60 IN | SYSTOLIC BLOOD PRESSURE: 125 MMHG

## 2021-11-12 DIAGNOSIS — K50.80 CROHN'S COLITIS: ICD-10-CM

## 2021-11-12 PROCEDURE — 96413 CHEMO IV INFUSION 1 HR: CPT | Performed by: INTERNAL MEDICINE

## 2021-11-12 PROCEDURE — 96415 CHEMO IV INFUSION ADDL HR: CPT | Performed by: INTERNAL MEDICINE

## 2021-11-12 RX ORDER — METOPROLOL SUCCINATE 50 MG/1
TAKE 1 TABLET (50 MG) BY ORAL ROUTE ONCE DAILY TABLET, EXTENDED RELEASE ORAL 1
Qty: 0 | Refills: 0 | Status: ACTIVE | COMMUNITY
Start: 1900-01-01

## 2021-11-12 RX ORDER — AMITRIPTYLINE HYDROCHLORIDE 25 MG/1
TAKE 1 TABLET (25 MG) BY ORAL ROUTE ONCE DAILY AT BEDTIME TABLET, FILM COATED ORAL 1
Qty: 0 | Refills: 0 | Status: ACTIVE | COMMUNITY
Start: 1900-01-01

## 2021-11-12 RX ORDER — MESALAMINE 800 MG/1
3 TABLETS TABLET, DELAYED RELEASE ORAL BID
Qty: 540 TABLET | Refills: 0 | Status: ACTIVE | COMMUNITY
Start: 2021-08-28 | End: 2021-11-25

## 2021-11-12 RX ORDER — LEVOTHYROXINE SODIUM 0.05 MG/1
TABLET ORAL
Qty: 0 | Refills: 0 | Status: ACTIVE | COMMUNITY
Start: 1900-01-01

## 2021-11-12 RX ORDER — RIZATRIPTAN BENZOATE 10 MG
PRN MIGRAINES TABLET ORAL
Qty: 0 | Refills: 0 | Status: ACTIVE | COMMUNITY
Start: 1900-01-01

## 2021-11-12 RX ORDER — FAMOTIDINE 40 MG/1
1 TABLET AS NEEDED TABLET, FILM COATED ORAL TWICE A DAY
Qty: 180 TABLET | Refills: 3 | Status: ACTIVE | COMMUNITY
Start: 2020-08-07

## 2021-11-17 ENCOUNTER — OFFICE VISIT (OUTPATIENT)
Dept: URBAN - METROPOLITAN AREA CLINIC 96 | Facility: CLINIC | Age: 61
End: 2021-11-17
Payer: COMMERCIAL

## 2021-11-17 ENCOUNTER — WEB ENCOUNTER (OUTPATIENT)
Dept: URBAN - METROPOLITAN AREA CLINIC 96 | Facility: CLINIC | Age: 61
End: 2021-11-17

## 2021-11-17 DIAGNOSIS — K50.80 CROHN'S COLITIS: ICD-10-CM

## 2021-11-17 DIAGNOSIS — M85.80 OSTEOPENIA: ICD-10-CM

## 2021-11-17 DIAGNOSIS — Z86.010 HISTORY OF ADENOMATOUS POLYP OF COLON: ICD-10-CM

## 2021-11-17 DIAGNOSIS — Z87.442 HISTORY OF KIDNEY STONES: ICD-10-CM

## 2021-11-17 DIAGNOSIS — N18.3 CKD (CHRONIC KIDNEY DISEASE) STAGE 3, GFR 30-59 ML/MIN: ICD-10-CM

## 2021-11-17 DIAGNOSIS — R10.11 RIGHT UPPER QUADRANT PAIN: ICD-10-CM

## 2021-11-17 DIAGNOSIS — K57.90 DIVERTICULOSIS: ICD-10-CM

## 2021-11-17 DIAGNOSIS — Z78.9 DRUG INTOLERANCE: ICD-10-CM

## 2021-11-17 DIAGNOSIS — K21.9 GERD (GASTROESOPHAGEAL REFLUX DISEASE): ICD-10-CM

## 2021-11-17 DIAGNOSIS — K90.89 BILE SALT-INDUCED DIARRHEA: ICD-10-CM

## 2021-11-17 PROCEDURE — 99214 OFFICE O/P EST MOD 30 MIN: CPT | Performed by: INTERNAL MEDICINE

## 2021-11-17 RX ORDER — AMITRIPTYLINE HYDROCHLORIDE 25 MG/1
TAKE 1 TABLET (25 MG) BY ORAL ROUTE ONCE DAILY AT BEDTIME TABLET, FILM COATED ORAL 1
Qty: 0 | Refills: 0 | Status: ACTIVE | COMMUNITY
Start: 1900-01-01

## 2021-11-17 RX ORDER — INFLIXIMAB 100 MG/10ML
AS DIRECTED INJECTION, POWDER, LYOPHILIZED, FOR SOLUTION INTRAVENOUS
Qty: 100 | Refills: 0 | OUTPATIENT
Start: 2022-10-31 | End: 2022-11-30

## 2021-11-17 RX ORDER — MESALAMINE 800 MG/1
3 TABLETS TABLET, DELAYED RELEASE ORAL BID
Qty: 540 TABLET | Refills: 0 | Status: ACTIVE | COMMUNITY
Start: 2021-08-28 | End: 2021-11-25

## 2021-11-17 RX ORDER — RIZATRIPTAN BENZOATE 10 MG
PRN MIGRAINES TABLET ORAL
Qty: 0 | Refills: 0 | Status: ACTIVE | COMMUNITY
Start: 1900-01-01

## 2021-11-17 RX ORDER — FAMOTIDINE 40 MG/1
1 TABLET AS NEEDED TABLET, FILM COATED ORAL TWICE A DAY
Qty: 180 TABLET | Refills: 3 | Status: ACTIVE | COMMUNITY
Start: 2020-08-07

## 2021-11-17 RX ORDER — METOPROLOL SUCCINATE 50 MG/1
TAKE 1 TABLET (50 MG) BY ORAL ROUTE ONCE DAILY TABLET, EXTENDED RELEASE ORAL 1
Qty: 0 | Refills: 0 | Status: ACTIVE | COMMUNITY
Start: 1900-01-01

## 2021-11-17 RX ORDER — LEVOTHYROXINE SODIUM 0.05 MG/1
TABLET ORAL
Qty: 0 | Refills: 0 | Status: ACTIVE | COMMUNITY
Start: 1900-01-01

## 2021-11-17 NOTE — HPI-TODAY'S VISIT:
62 yo F homemaker last  ov 8/7/2020 gerd doing well on pepcid 40mg bid on asacol and remicade for her CD but, has bile salt induced diarrhea which was rxed w welchol. but then did better off artificial  sweeteners and didnt req welchol now having 4 loose bms in am  "until emptied out". usually done by 1:00 pm.  has had PC ruqp lasting one hr int for the last yr.

## 2021-11-18 ENCOUNTER — TELEPHONE ENCOUNTER (OUTPATIENT)
Dept: URBAN - METROPOLITAN AREA CLINIC 92 | Facility: CLINIC | Age: 61
End: 2021-11-18

## 2021-11-18 LAB
A/G RATIO: 1.4
ALBUMIN: 4.4
ALKALINE PHOSPHATASE: 78
ALT (SGPT): 18
AST (SGOT): 18
BASO (ABSOLUTE): 0
BASOS: 1
BILIRUBIN, TOTAL: 0.7
BUN/CREATININE RATIO: 15
BUN: 16
CALCIUM: 9.7
CARBON DIOXIDE, TOTAL: 25
CHLORIDE: 104
CREATININE: 1.08
EGFR IF AFRICN AM: 64
EGFR IF NONAFRICN AM: 55
EOS (ABSOLUTE): 0
EOS: 1
GLOBULIN, TOTAL: 3.2
GLUCOSE: 100
HEMATOCRIT: 46
HEMATOLOGY COMMENTS:: (no result)
HEMOGLOBIN: 14.8
IMMATURE CELLS: (no result)
IMMATURE GRANS (ABS): 0
IMMATURE GRANULOCYTES: 0
LYMPHS (ABSOLUTE): 2
LYMPHS: 38
MCH: 29.1
MCHC: 32.2
MCV: 91
MONOCYTES(ABSOLUTE): 0.6
MONOCYTES: 11
NEUTROPHILS (ABSOLUTE): 2.7
NEUTROPHILS: 49
NRBC: (no result)
PLATELETS: 279
POTASSIUM: 4.4
PROTEIN, TOTAL: 7.6
RBC: 5.08
RDW: 12.7
SODIUM: 141
VITAMIN B12: 1600
VITAMIN D, 25-HYDROXY: 74.3
WBC: 5.4

## 2021-11-18 RX ORDER — MESALAMINE 800 MG/1
3 TABLETS TABLET, DELAYED RELEASE ORAL
Qty: 540 TABLET | Refills: 0 | OUTPATIENT
Start: 2021-11-18 | End: 2022-02-16

## 2021-11-18 RX ORDER — FAMOTIDINE 40 MG/1
1 TABLET AS NEEDED TABLET, FILM COATED ORAL TWICE A DAY
Qty: 180 TABLET | Refills: 3 | OUTPATIENT
Start: 2021-11-18

## 2021-11-18 RX ORDER — COLESEVELAM HYDROCHLORIDE 625 MG/1
3 TABLETS TABLET, FILM COATED ORAL QHS
Qty: 270 | Refills: 3 | OUTPATIENT
Start: 2021-11-18

## 2021-11-30 ENCOUNTER — WEB ENCOUNTER (OUTPATIENT)
Dept: URBAN - METROPOLITAN AREA CLINIC 96 | Facility: CLINIC | Age: 61
End: 2021-11-30

## 2021-12-30 ENCOUNTER — OFFICE VISIT (OUTPATIENT)
Dept: URBAN - METROPOLITAN AREA CLINIC 79 | Facility: CLINIC | Age: 61
End: 2021-12-30
Payer: COMMERCIAL

## 2021-12-30 VITALS
BODY MASS INDEX: 35.93 KG/M2 | DIASTOLIC BLOOD PRESSURE: 73 MMHG | WEIGHT: 183 LBS | TEMPERATURE: 97.3 F | RESPIRATION RATE: 20 BRPM | SYSTOLIC BLOOD PRESSURE: 113 MMHG | HEART RATE: 102 BPM | HEIGHT: 60 IN

## 2021-12-30 DIAGNOSIS — K50.80 CROHN'S COLITIS: ICD-10-CM

## 2021-12-30 PROCEDURE — 96413 CHEMO IV INFUSION 1 HR: CPT | Performed by: INTERNAL MEDICINE

## 2021-12-30 PROCEDURE — 96415 CHEMO IV INFUSION ADDL HR: CPT | Performed by: INTERNAL MEDICINE

## 2021-12-30 RX ORDER — RIZATRIPTAN BENZOATE 10 MG
PRN MIGRAINES TABLET ORAL
Qty: 0 | Refills: 0 | Status: ACTIVE | COMMUNITY
Start: 1900-01-01

## 2021-12-30 RX ORDER — FAMOTIDINE 40 MG/1
1 TABLET AS NEEDED TABLET, FILM COATED ORAL TWICE A DAY
Qty: 180 TABLET | Refills: 3 | Status: ACTIVE | COMMUNITY
Start: 2020-08-07

## 2021-12-30 RX ORDER — AMITRIPTYLINE HYDROCHLORIDE 25 MG/1
TAKE 1 TABLET (25 MG) BY ORAL ROUTE ONCE DAILY AT BEDTIME TABLET, FILM COATED ORAL 1
Qty: 0 | Refills: 0 | Status: ACTIVE | COMMUNITY
Start: 1900-01-01

## 2021-12-30 RX ORDER — FAMOTIDINE 40 MG/1
1 TABLET AS NEEDED TABLET, FILM COATED ORAL TWICE A DAY
Qty: 180 TABLET | Refills: 3 | Status: ACTIVE | COMMUNITY
Start: 2021-11-18

## 2021-12-30 RX ORDER — METOPROLOL SUCCINATE 50 MG/1
TAKE 1 TABLET (50 MG) BY ORAL ROUTE ONCE DAILY TABLET, EXTENDED RELEASE ORAL 1
Qty: 0 | Refills: 0 | Status: ACTIVE | COMMUNITY
Start: 1900-01-01

## 2021-12-30 RX ORDER — LEVOTHYROXINE SODIUM 0.05 MG/1
TABLET ORAL
Qty: 0 | Refills: 0 | Status: ACTIVE | COMMUNITY
Start: 1900-01-01

## 2021-12-30 RX ORDER — COLESEVELAM HYDROCHLORIDE 625 MG/1
3 TABLETS TABLET, FILM COATED ORAL QHS
Qty: 270 | Refills: 3 | Status: ACTIVE | COMMUNITY
Start: 2021-11-18

## 2021-12-30 RX ORDER — MESALAMINE 800 MG/1
3 TABLETS TABLET, DELAYED RELEASE ORAL
Qty: 540 TABLET | Refills: 0 | Status: ACTIVE | COMMUNITY
Start: 2021-11-18 | End: 2022-02-16

## 2022-01-13 ENCOUNTER — WEB ENCOUNTER (OUTPATIENT)
Dept: URBAN - METROPOLITAN AREA CLINIC 96 | Facility: CLINIC | Age: 62
End: 2022-01-13

## 2022-01-18 ENCOUNTER — WEB ENCOUNTER (OUTPATIENT)
Dept: URBAN - METROPOLITAN AREA CLINIC 96 | Facility: CLINIC | Age: 62
End: 2022-01-18

## 2022-01-19 ENCOUNTER — TELEPHONE ENCOUNTER (OUTPATIENT)
Dept: URBAN - METROPOLITAN AREA CLINIC 92 | Facility: CLINIC | Age: 62
End: 2022-01-19

## 2022-01-25 LAB
SARS-COV-2 SEMI-QUANT TOTAL AB: 1032
SARS-COV-2 SPIKE AB INTERP: POSITIVE

## 2022-02-03 ENCOUNTER — TELEPHONE ENCOUNTER (OUTPATIENT)
Dept: URBAN - METROPOLITAN AREA CLINIC 92 | Facility: CLINIC | Age: 62
End: 2022-02-03

## 2022-02-03 RX ORDER — MESALAMINE 800 MG/1
2 TABLETS TABLET, DELAYED RELEASE ORAL
Qty: 360 TABLET | Refills: 2 | OUTPATIENT
Start: 2022-02-03 | End: 2022-10-31

## 2022-02-10 ENCOUNTER — OFFICE VISIT (OUTPATIENT)
Dept: URBAN - METROPOLITAN AREA CLINIC 79 | Facility: CLINIC | Age: 62
End: 2022-02-10
Payer: COMMERCIAL

## 2022-02-10 VITALS
BODY MASS INDEX: 35.53 KG/M2 | WEIGHT: 181 LBS | RESPIRATION RATE: 20 BRPM | HEIGHT: 60 IN | HEART RATE: 87 BPM | SYSTOLIC BLOOD PRESSURE: 110 MMHG | DIASTOLIC BLOOD PRESSURE: 69 MMHG | TEMPERATURE: 97.7 F

## 2022-02-10 DIAGNOSIS — K50.80 CROHN'S COLITIS: ICD-10-CM

## 2022-02-10 PROCEDURE — 96413 CHEMO IV INFUSION 1 HR: CPT | Performed by: INTERNAL MEDICINE

## 2022-02-10 PROCEDURE — 96415 CHEMO IV INFUSION ADDL HR: CPT | Performed by: INTERNAL MEDICINE

## 2022-02-10 RX ORDER — RIZATRIPTAN BENZOATE 10 MG
PRN MIGRAINES TABLET ORAL
Qty: 0 | Refills: 0 | Status: ACTIVE | COMMUNITY
Start: 1900-01-01

## 2022-02-10 RX ORDER — MESALAMINE 800 MG/1
2 TABLETS TABLET, DELAYED RELEASE ORAL
Qty: 360 TABLET | Refills: 2 | Status: ACTIVE | COMMUNITY
Start: 2022-02-03 | End: 2022-10-31

## 2022-02-10 RX ORDER — FAMOTIDINE 40 MG/1
1 TABLET AS NEEDED TABLET, FILM COATED ORAL TWICE A DAY
Qty: 180 TABLET | Refills: 3 | Status: ACTIVE | COMMUNITY
Start: 2021-11-18

## 2022-02-10 RX ORDER — COLESEVELAM HYDROCHLORIDE 625 MG/1
3 TABLETS TABLET, FILM COATED ORAL QHS
Qty: 270 | Refills: 3 | Status: ACTIVE | COMMUNITY
Start: 2021-11-18

## 2022-02-10 RX ORDER — FAMOTIDINE 40 MG/1
1 TABLET AS NEEDED TABLET, FILM COATED ORAL TWICE A DAY
Qty: 180 TABLET | Refills: 3 | Status: ACTIVE | COMMUNITY
Start: 2020-08-07

## 2022-02-10 RX ORDER — LEVOTHYROXINE SODIUM 0.05 MG/1
TABLET ORAL
Qty: 0 | Refills: 0 | Status: ACTIVE | COMMUNITY
Start: 1900-01-01

## 2022-02-10 RX ORDER — METOPROLOL SUCCINATE 50 MG/1
TAKE 1 TABLET (50 MG) BY ORAL ROUTE ONCE DAILY TABLET, EXTENDED RELEASE ORAL 1
Qty: 0 | Refills: 0 | Status: ACTIVE | COMMUNITY
Start: 1900-01-01

## 2022-02-10 RX ORDER — AMITRIPTYLINE HYDROCHLORIDE 25 MG/1
TAKE 1 TABLET (25 MG) BY ORAL ROUTE ONCE DAILY AT BEDTIME TABLET, FILM COATED ORAL 1
Qty: 0 | Refills: 0 | Status: ACTIVE | COMMUNITY
Start: 1900-01-01

## 2022-02-10 RX ORDER — MESALAMINE 800 MG/1
3 TABLETS TABLET, DELAYED RELEASE ORAL
Qty: 540 TABLET | Refills: 0 | Status: ACTIVE | COMMUNITY
Start: 2021-11-18 | End: 2022-02-16

## 2022-03-21 ENCOUNTER — OFFICE VISIT (OUTPATIENT)
Dept: URBAN - METROPOLITAN AREA CLINIC 79 | Facility: CLINIC | Age: 62
End: 2022-03-21
Payer: COMMERCIAL

## 2022-03-21 VITALS
WEIGHT: 182 LBS | BODY MASS INDEX: 35.73 KG/M2 | SYSTOLIC BLOOD PRESSURE: 117 MMHG | HEART RATE: 102 BPM | RESPIRATION RATE: 18 BRPM | DIASTOLIC BLOOD PRESSURE: 77 MMHG | TEMPERATURE: 97.5 F | HEIGHT: 60 IN

## 2022-03-21 DIAGNOSIS — K50.80 CROHN'S COLITIS: ICD-10-CM

## 2022-03-21 PROCEDURE — 96413 CHEMO IV INFUSION 1 HR: CPT | Performed by: INTERNAL MEDICINE

## 2022-03-21 PROCEDURE — 96415 CHEMO IV INFUSION ADDL HR: CPT | Performed by: INTERNAL MEDICINE

## 2022-03-21 RX ORDER — FAMOTIDINE 40 MG/1
1 TABLET AS NEEDED TABLET, FILM COATED ORAL TWICE A DAY
Qty: 180 TABLET | Refills: 3 | Status: ACTIVE | COMMUNITY
Start: 2021-11-18

## 2022-03-21 RX ORDER — LEVOTHYROXINE SODIUM 0.05 MG/1
TABLET ORAL
Qty: 0 | Refills: 0 | Status: ACTIVE | COMMUNITY
Start: 1900-01-01

## 2022-03-21 RX ORDER — RIZATRIPTAN BENZOATE 10 MG
PRN MIGRAINES TABLET ORAL
Qty: 0 | Refills: 0 | Status: ACTIVE | COMMUNITY
Start: 1900-01-01

## 2022-03-21 RX ORDER — COLESEVELAM HYDROCHLORIDE 625 MG/1
3 TABLETS TABLET, FILM COATED ORAL QHS
Qty: 270 | Refills: 3 | Status: ACTIVE | COMMUNITY
Start: 2021-11-18

## 2022-03-21 RX ORDER — AMITRIPTYLINE HYDROCHLORIDE 25 MG/1
TAKE 1 TABLET (25 MG) BY ORAL ROUTE ONCE DAILY AT BEDTIME TABLET, FILM COATED ORAL 1
Qty: 0 | Refills: 0 | Status: ACTIVE | COMMUNITY
Start: 1900-01-01

## 2022-03-21 RX ORDER — MESALAMINE 800 MG/1
2 TABLETS TABLET, DELAYED RELEASE ORAL
Qty: 360 TABLET | Refills: 2 | Status: ACTIVE | COMMUNITY
Start: 2022-02-03 | End: 2022-10-31

## 2022-03-21 RX ORDER — FAMOTIDINE 40 MG/1
1 TABLET AS NEEDED TABLET, FILM COATED ORAL TWICE A DAY
Qty: 180 TABLET | Refills: 3 | Status: ACTIVE | COMMUNITY
Start: 2020-08-07

## 2022-03-21 RX ORDER — METOPROLOL SUCCINATE 50 MG/1
TAKE 1 TABLET (50 MG) BY ORAL ROUTE ONCE DAILY TABLET, EXTENDED RELEASE ORAL 1
Qty: 0 | Refills: 0 | Status: ACTIVE | COMMUNITY
Start: 1900-01-01

## 2022-03-24 ENCOUNTER — OFFICE VISIT (OUTPATIENT)
Dept: URBAN - METROPOLITAN AREA CLINIC 79 | Facility: CLINIC | Age: 62
End: 2022-03-24

## 2022-05-05 ENCOUNTER — OFFICE VISIT (OUTPATIENT)
Dept: URBAN - METROPOLITAN AREA CLINIC 79 | Facility: CLINIC | Age: 62
End: 2022-05-05
Payer: COMMERCIAL

## 2022-05-05 VITALS
RESPIRATION RATE: 18 BRPM | DIASTOLIC BLOOD PRESSURE: 71 MMHG | SYSTOLIC BLOOD PRESSURE: 108 MMHG | BODY MASS INDEX: 35.73 KG/M2 | TEMPERATURE: 97.7 F | HEART RATE: 104 BPM | WEIGHT: 182 LBS | HEIGHT: 60 IN

## 2022-05-05 DIAGNOSIS — K50.80 CROHN'S COLITIS: ICD-10-CM

## 2022-05-05 PROCEDURE — 96413 CHEMO IV INFUSION 1 HR: CPT | Performed by: INTERNAL MEDICINE

## 2022-05-05 PROCEDURE — 96415 CHEMO IV INFUSION ADDL HR: CPT | Performed by: INTERNAL MEDICINE

## 2022-05-05 RX ORDER — FAMOTIDINE 40 MG/1
1 TABLET AS NEEDED TABLET, FILM COATED ORAL TWICE A DAY
Qty: 180 TABLET | Refills: 3 | Status: ACTIVE | COMMUNITY
Start: 2020-08-07

## 2022-05-05 RX ORDER — AMITRIPTYLINE HYDROCHLORIDE 25 MG/1
TAKE 1 TABLET (25 MG) BY ORAL ROUTE ONCE DAILY AT BEDTIME TABLET, FILM COATED ORAL 1
Qty: 0 | Refills: 0 | Status: ACTIVE | COMMUNITY
Start: 1900-01-01

## 2022-05-05 RX ORDER — METOPROLOL SUCCINATE 50 MG/1
TAKE 1 TABLET (50 MG) BY ORAL ROUTE ONCE DAILY TABLET, EXTENDED RELEASE ORAL 1
Qty: 0 | Refills: 0 | Status: ACTIVE | COMMUNITY
Start: 1900-01-01

## 2022-05-05 RX ORDER — LEVOTHYROXINE SODIUM 0.05 MG/1
TABLET ORAL
Qty: 0 | Refills: 0 | Status: ACTIVE | COMMUNITY
Start: 1900-01-01

## 2022-05-05 RX ORDER — MESALAMINE 800 MG/1
2 TABLETS TABLET, DELAYED RELEASE ORAL
Qty: 360 TABLET | Refills: 2 | Status: ACTIVE | COMMUNITY
Start: 2022-02-03 | End: 2022-10-31

## 2022-05-05 RX ORDER — FAMOTIDINE 40 MG/1
1 TABLET AS NEEDED TABLET, FILM COATED ORAL TWICE A DAY
Qty: 180 TABLET | Refills: 3 | Status: ACTIVE | COMMUNITY
Start: 2021-11-18

## 2022-05-05 RX ORDER — RIZATRIPTAN BENZOATE 10 MG
PRN MIGRAINES TABLET ORAL
Qty: 0 | Refills: 0 | Status: ACTIVE | COMMUNITY
Start: 1900-01-01

## 2022-05-05 RX ORDER — COLESEVELAM HYDROCHLORIDE 625 MG/1
3 TABLETS TABLET, FILM COATED ORAL QHS
Qty: 270 | Refills: 3 | Status: ACTIVE | COMMUNITY
Start: 2021-11-18

## 2022-06-16 ENCOUNTER — OFFICE VISIT (OUTPATIENT)
Dept: URBAN - METROPOLITAN AREA CLINIC 79 | Facility: CLINIC | Age: 62
End: 2022-06-16
Payer: COMMERCIAL

## 2022-06-16 VITALS
HEART RATE: 92 BPM | BODY MASS INDEX: 35.93 KG/M2 | WEIGHT: 183 LBS | SYSTOLIC BLOOD PRESSURE: 107 MMHG | RESPIRATION RATE: 20 BRPM | DIASTOLIC BLOOD PRESSURE: 68 MMHG | HEIGHT: 60 IN | TEMPERATURE: 97.9 F

## 2022-06-16 DIAGNOSIS — K50.80 CROHN'S COLITIS: ICD-10-CM

## 2022-06-16 PROCEDURE — 96415 CHEMO IV INFUSION ADDL HR: CPT | Performed by: INTERNAL MEDICINE

## 2022-06-16 PROCEDURE — 96413 CHEMO IV INFUSION 1 HR: CPT | Performed by: INTERNAL MEDICINE

## 2022-06-16 RX ORDER — AMITRIPTYLINE HYDROCHLORIDE 25 MG/1
TAKE 1 TABLET (25 MG) BY ORAL ROUTE ONCE DAILY AT BEDTIME TABLET, FILM COATED ORAL 1
Qty: 0 | Refills: 0 | Status: ACTIVE | COMMUNITY
Start: 1900-01-01

## 2022-06-16 RX ORDER — FAMOTIDINE 40 MG/1
1 TABLET AS NEEDED TABLET, FILM COATED ORAL TWICE A DAY
Qty: 180 TABLET | Refills: 3 | Status: ACTIVE | COMMUNITY
Start: 2021-11-18

## 2022-06-16 RX ORDER — LEVOTHYROXINE SODIUM 0.05 MG/1
TABLET ORAL
Qty: 0 | Refills: 0 | Status: ACTIVE | COMMUNITY
Start: 1900-01-01

## 2022-06-16 RX ORDER — RIZATRIPTAN BENZOATE 10 MG
PRN MIGRAINES TABLET ORAL
Qty: 0 | Refills: 0 | Status: ACTIVE | COMMUNITY
Start: 1900-01-01

## 2022-06-16 RX ORDER — COLESEVELAM HYDROCHLORIDE 625 MG/1
3 TABLETS TABLET, FILM COATED ORAL QHS
Qty: 270 | Refills: 3 | Status: ACTIVE | COMMUNITY
Start: 2021-11-18

## 2022-06-16 RX ORDER — FAMOTIDINE 40 MG/1
1 TABLET AS NEEDED TABLET, FILM COATED ORAL TWICE A DAY
Qty: 180 TABLET | Refills: 3 | Status: ACTIVE | COMMUNITY
Start: 2020-08-07

## 2022-06-16 RX ORDER — MESALAMINE 800 MG/1
2 TABLETS TABLET, DELAYED RELEASE ORAL
Qty: 360 TABLET | Refills: 2 | Status: ACTIVE | COMMUNITY
Start: 2022-02-03 | End: 2022-10-31

## 2022-06-16 RX ORDER — METOPROLOL SUCCINATE 50 MG/1
TAKE 1 TABLET (50 MG) BY ORAL ROUTE ONCE DAILY TABLET, EXTENDED RELEASE ORAL 1
Qty: 0 | Refills: 0 | Status: ACTIVE | COMMUNITY
Start: 1900-01-01

## 2022-07-28 ENCOUNTER — OFFICE VISIT (OUTPATIENT)
Dept: URBAN - METROPOLITAN AREA CLINIC 79 | Facility: CLINIC | Age: 62
End: 2022-07-28
Payer: COMMERCIAL

## 2022-07-28 VITALS
TEMPERATURE: 97.7 F | HEIGHT: 60 IN | RESPIRATION RATE: 20 BRPM | WEIGHT: 182 LBS | BODY MASS INDEX: 35.73 KG/M2 | HEART RATE: 82 BPM | DIASTOLIC BLOOD PRESSURE: 62 MMHG | SYSTOLIC BLOOD PRESSURE: 98 MMHG

## 2022-07-28 DIAGNOSIS — K50.80 CROHN'S COLITIS: ICD-10-CM

## 2022-07-28 PROCEDURE — 96415 CHEMO IV INFUSION ADDL HR: CPT | Performed by: INTERNAL MEDICINE

## 2022-07-28 PROCEDURE — 96413 CHEMO IV INFUSION 1 HR: CPT | Performed by: INTERNAL MEDICINE

## 2022-07-28 RX ORDER — RIZATRIPTAN BENZOATE 10 MG
PRN MIGRAINES TABLET ORAL
Qty: 0 | Refills: 0 | Status: ACTIVE | COMMUNITY
Start: 1900-01-01

## 2022-07-28 RX ORDER — FAMOTIDINE 40 MG/1
1 TABLET AS NEEDED TABLET, FILM COATED ORAL TWICE A DAY
Qty: 180 TABLET | Refills: 3 | Status: ACTIVE | COMMUNITY
Start: 2020-08-07

## 2022-07-28 RX ORDER — FAMOTIDINE 40 MG/1
1 TABLET AS NEEDED TABLET, FILM COATED ORAL TWICE A DAY
Qty: 180 TABLET | Refills: 3 | Status: ACTIVE | COMMUNITY
Start: 2021-11-18

## 2022-07-28 RX ORDER — MESALAMINE 800 MG/1
2 TABLETS TABLET, DELAYED RELEASE ORAL
Qty: 360 TABLET | Refills: 2 | Status: ACTIVE | COMMUNITY
Start: 2022-02-03 | End: 2022-10-31

## 2022-07-28 RX ORDER — LEVOTHYROXINE SODIUM 0.05 MG/1
TABLET ORAL
Qty: 0 | Refills: 0 | Status: ACTIVE | COMMUNITY
Start: 1900-01-01

## 2022-07-28 RX ORDER — METOPROLOL SUCCINATE 50 MG/1
TAKE 1 TABLET (50 MG) BY ORAL ROUTE ONCE DAILY TABLET, EXTENDED RELEASE ORAL 1
Qty: 0 | Refills: 0 | Status: ACTIVE | COMMUNITY
Start: 1900-01-01

## 2022-07-28 RX ORDER — AMITRIPTYLINE HYDROCHLORIDE 25 MG/1
TAKE 1 TABLET (25 MG) BY ORAL ROUTE ONCE DAILY AT BEDTIME TABLET, FILM COATED ORAL 1
Qty: 0 | Refills: 0 | Status: ACTIVE | COMMUNITY
Start: 1900-01-01

## 2022-07-28 RX ORDER — COLESEVELAM HYDROCHLORIDE 625 MG/1
3 TABLETS TABLET, FILM COATED ORAL QHS
Qty: 270 | Refills: 3 | Status: ACTIVE | COMMUNITY
Start: 2021-11-18

## 2022-09-07 ENCOUNTER — WEB ENCOUNTER (OUTPATIENT)
Dept: URBAN - METROPOLITAN AREA CLINIC 79 | Facility: CLINIC | Age: 62
End: 2022-09-07

## 2022-09-12 ENCOUNTER — OFFICE VISIT (OUTPATIENT)
Dept: URBAN - METROPOLITAN AREA CLINIC 79 | Facility: CLINIC | Age: 62
End: 2022-09-12
Payer: COMMERCIAL

## 2022-09-12 VITALS
BODY MASS INDEX: 35.53 KG/M2 | RESPIRATION RATE: 20 BRPM | TEMPERATURE: 97.9 F | HEART RATE: 84 BPM | SYSTOLIC BLOOD PRESSURE: 119 MMHG | HEIGHT: 60 IN | WEIGHT: 181 LBS | DIASTOLIC BLOOD PRESSURE: 76 MMHG

## 2022-09-12 DIAGNOSIS — K50.80 CROHN'S COLITIS: ICD-10-CM

## 2022-09-12 PROCEDURE — 96413 CHEMO IV INFUSION 1 HR: CPT | Performed by: INTERNAL MEDICINE

## 2022-09-12 PROCEDURE — 96415 CHEMO IV INFUSION ADDL HR: CPT | Performed by: INTERNAL MEDICINE

## 2022-09-12 RX ORDER — FAMOTIDINE 40 MG/1
1 TABLET AS NEEDED TABLET, FILM COATED ORAL TWICE A DAY
Qty: 180 TABLET | Refills: 3 | Status: ACTIVE | COMMUNITY
Start: 2021-11-18

## 2022-09-12 RX ORDER — AMITRIPTYLINE HYDROCHLORIDE 25 MG/1
TAKE 1 TABLET (25 MG) BY ORAL ROUTE ONCE DAILY AT BEDTIME TABLET, FILM COATED ORAL 1
Qty: 0 | Refills: 0 | Status: ACTIVE | COMMUNITY
Start: 1900-01-01

## 2022-09-12 RX ORDER — COLESEVELAM HYDROCHLORIDE 625 MG/1
3 TABLETS TABLET, FILM COATED ORAL QHS
Qty: 270 | Refills: 3 | Status: ACTIVE | COMMUNITY
Start: 2021-11-18

## 2022-09-12 RX ORDER — METOPROLOL SUCCINATE 50 MG/1
TAKE 1 TABLET (50 MG) BY ORAL ROUTE ONCE DAILY TABLET, EXTENDED RELEASE ORAL 1
Qty: 0 | Refills: 0 | Status: ACTIVE | COMMUNITY
Start: 1900-01-01

## 2022-09-12 RX ORDER — MESALAMINE 800 MG/1
2 TABLETS TABLET, DELAYED RELEASE ORAL
Qty: 360 TABLET | Refills: 2 | Status: ACTIVE | COMMUNITY
Start: 2022-02-03 | End: 2022-10-31

## 2022-09-12 RX ORDER — FAMOTIDINE 40 MG/1
1 TABLET AS NEEDED TABLET, FILM COATED ORAL TWICE A DAY
Qty: 180 TABLET | Refills: 3 | Status: ACTIVE | COMMUNITY
Start: 2020-08-07

## 2022-09-12 RX ORDER — RIZATRIPTAN BENZOATE 10 MG
PRN MIGRAINES TABLET ORAL
Qty: 0 | Refills: 0 | Status: ACTIVE | COMMUNITY
Start: 1900-01-01

## 2022-09-12 RX ORDER — LEVOTHYROXINE SODIUM 0.05 MG/1
TABLET ORAL
Qty: 0 | Refills: 0 | Status: ACTIVE | COMMUNITY
Start: 1900-01-01

## 2022-10-24 ENCOUNTER — OFFICE VISIT (OUTPATIENT)
Dept: URBAN - METROPOLITAN AREA CLINIC 79 | Facility: CLINIC | Age: 62
End: 2022-10-24

## 2022-10-24 ENCOUNTER — WEB ENCOUNTER (OUTPATIENT)
Dept: URBAN - METROPOLITAN AREA CLINIC 96 | Facility: CLINIC | Age: 62
End: 2022-10-24

## 2022-10-26 ENCOUNTER — WEB ENCOUNTER (OUTPATIENT)
Dept: URBAN - METROPOLITAN AREA CLINIC 96 | Facility: CLINIC | Age: 62
End: 2022-10-26

## 2022-10-26 ENCOUNTER — TELEPHONE ENCOUNTER (OUTPATIENT)
Dept: URBAN - METROPOLITAN AREA CLINIC 73 | Facility: CLINIC | Age: 62
End: 2022-10-26

## 2022-10-26 ENCOUNTER — OFFICE VISIT (OUTPATIENT)
Dept: URBAN - METROPOLITAN AREA CLINIC 73 | Facility: CLINIC | Age: 62
End: 2022-10-26
Payer: COMMERCIAL

## 2022-10-26 VITALS
WEIGHT: 182.4 LBS | SYSTOLIC BLOOD PRESSURE: 106 MMHG | HEIGHT: 60 IN | RESPIRATION RATE: 17 BRPM | HEART RATE: 84 BPM | TEMPERATURE: 98.7 F | BODY MASS INDEX: 35.81 KG/M2 | DIASTOLIC BLOOD PRESSURE: 62 MMHG

## 2022-10-26 DIAGNOSIS — K50.80 CROHN'S COLITIS: ICD-10-CM

## 2022-10-26 PROCEDURE — 96415 CHEMO IV INFUSION ADDL HR: CPT | Performed by: INTERNAL MEDICINE

## 2022-10-26 PROCEDURE — 96413 CHEMO IV INFUSION 1 HR: CPT | Performed by: INTERNAL MEDICINE

## 2022-10-26 RX ORDER — FAMOTIDINE 40 MG/1
1 TABLET AS NEEDED TABLET, FILM COATED ORAL TWICE A DAY
Qty: 180 TABLET | Refills: 3 | Status: ACTIVE | COMMUNITY
Start: 2021-11-18

## 2022-10-26 RX ORDER — COLESEVELAM HYDROCHLORIDE 625 MG/1
3 TABLETS TABLET, FILM COATED ORAL QHS
Qty: 270 | Refills: 3 | Status: ACTIVE | COMMUNITY
Start: 2021-11-18

## 2022-10-26 RX ORDER — AMITRIPTYLINE HYDROCHLORIDE 25 MG/1
TAKE 1 TABLET (25 MG) BY ORAL ROUTE ONCE DAILY AT BEDTIME TABLET, FILM COATED ORAL 1
Qty: 0 | Refills: 0 | Status: ACTIVE | COMMUNITY
Start: 1900-01-01

## 2022-10-26 RX ORDER — FAMOTIDINE 40 MG/1
1 TABLET AS NEEDED TABLET, FILM COATED ORAL TWICE A DAY
Qty: 180 TABLET | Refills: 3 | Status: ACTIVE | COMMUNITY
Start: 2020-08-07

## 2022-10-26 RX ORDER — FAMOTIDINE 40 MG/1
1 TABLET AS NEEDED TABLET, FILM COATED ORAL TWICE A DAY
Qty: 180 TABLET | Refills: 3 | OUTPATIENT
Start: 2022-10-26

## 2022-10-26 RX ORDER — MESALAMINE 800 MG/1
2 TABLETS TABLET, DELAYED RELEASE ORAL
Qty: 360 TABLET | Refills: 2 | Status: ACTIVE | COMMUNITY
Start: 2022-02-03 | End: 2022-10-31

## 2022-10-26 RX ORDER — METOPROLOL SUCCINATE 50 MG/1
TAKE 1 TABLET (50 MG) BY ORAL ROUTE ONCE DAILY TABLET, EXTENDED RELEASE ORAL 1
Qty: 0 | Refills: 0 | Status: ACTIVE | COMMUNITY
Start: 1900-01-01

## 2022-10-26 RX ORDER — LEVOTHYROXINE SODIUM 0.05 MG/1
TABLET ORAL
Qty: 0 | Refills: 0 | Status: ACTIVE | COMMUNITY
Start: 1900-01-01

## 2022-10-26 RX ORDER — RIZATRIPTAN BENZOATE 10 MG
PRN MIGRAINES TABLET ORAL
Qty: 0 | Refills: 0 | Status: ACTIVE | COMMUNITY
Start: 1900-01-01

## 2022-10-31 PROBLEM — 197476001 INTESTINAL MALABSORPTION: Status: ACTIVE | Noted: 2020-08-07

## 2022-10-31 PROBLEM — 398050005 DIVERTICULAR DISEASE OF COLON: Status: ACTIVE | Noted: 2020-08-07

## 2022-10-31 PROBLEM — 235595009 GASTROESOPHAGEAL REFLUX DISEASE: Status: ACTIVE | Noted: 2020-08-07

## 2022-10-31 PROBLEM — 429047008 HISTORY OF ADENOMATOUS POLYP OF COLON: Status: ACTIVE | Noted: 2020-08-07

## 2022-10-31 PROBLEM — 190634004 VITAMIN B12 DEFICIENCY: Status: ACTIVE | Noted: 2020-08-07

## 2022-10-31 PROBLEM — 429025008 HISTORY OF CALCULUS OF KIDNEY: Status: ACTIVE | Noted: 2020-08-07

## 2022-10-31 PROBLEM — 59037007 DRUG INTOLERANCE: Status: ACTIVE | Noted: 2020-08-07

## 2022-10-31 PROBLEM — 433144002 CHRONIC KIDNEY DISEASE STAGE 3: Status: ACTIVE | Noted: 2020-08-07

## 2022-10-31 PROBLEM — 34000006 CROHNS DISEASE: Status: ACTIVE | Noted: 2020-08-07

## 2022-10-31 PROBLEM — 312894000 OSTEOPENIA: Status: ACTIVE | Noted: 2020-08-07

## 2022-10-31 PROBLEM — 80541000119104 HISTORY OF NUTRITIONAL DEFICIENCY: Status: ACTIVE | Noted: 2020-08-07

## 2022-10-31 PROBLEM — 275129008 FAMILY HISTORY OF ULCERATIVE COLITIS: Status: ACTIVE | Noted: 2020-08-07

## 2022-11-13 ENCOUNTER — TELEPHONE ENCOUNTER (OUTPATIENT)
Dept: URBAN - METROPOLITAN AREA CLINIC 92 | Facility: CLINIC | Age: 62
End: 2022-11-13

## 2022-11-13 RX ORDER — COLESEVELAM HYDROCHLORIDE 625 MG/1
3 TABLETS AT BEDTIME TABLET, FILM COATED ORAL ONCE A DAY
Qty: 270 TABLET | Refills: 3 | OUTPATIENT
Start: 2022-11-13

## 2022-11-17 ENCOUNTER — WEB ENCOUNTER (OUTPATIENT)
Dept: URBAN - METROPOLITAN AREA CLINIC 96 | Facility: CLINIC | Age: 62
End: 2022-11-17

## 2022-11-17 RX ORDER — MESALAMINE 800 MG/1
2 TABLETS TABLET, DELAYED RELEASE ORAL TWICE A DAY
Qty: 360 TABLET | Refills: 3 | OUTPATIENT
Start: 2022-11-18 | End: 2023-11-13

## 2022-12-05 ENCOUNTER — OFFICE VISIT (OUTPATIENT)
Dept: URBAN - METROPOLITAN AREA CLINIC 79 | Facility: CLINIC | Age: 62
End: 2022-12-05
Payer: COMMERCIAL

## 2022-12-05 ENCOUNTER — TELEPHONE ENCOUNTER (OUTPATIENT)
Dept: URBAN - METROPOLITAN AREA CLINIC 79 | Facility: CLINIC | Age: 62
End: 2022-12-05

## 2022-12-05 VITALS
DIASTOLIC BLOOD PRESSURE: 67 MMHG | TEMPERATURE: 97.8 F | RESPIRATION RATE: 18 BRPM | HEART RATE: 86 BPM | BODY MASS INDEX: 35.73 KG/M2 | SYSTOLIC BLOOD PRESSURE: 107 MMHG | WEIGHT: 182 LBS | HEIGHT: 60 IN

## 2022-12-05 DIAGNOSIS — K50.80 CROHN'S COLITIS: ICD-10-CM

## 2022-12-05 PROCEDURE — 96415 CHEMO IV INFUSION ADDL HR: CPT | Performed by: INTERNAL MEDICINE

## 2022-12-05 PROCEDURE — 96413 CHEMO IV INFUSION 1 HR: CPT | Performed by: INTERNAL MEDICINE

## 2022-12-05 RX ORDER — LEVOTHYROXINE SODIUM 0.05 MG/1
TABLET ORAL
Qty: 0 | Refills: 0 | Status: ACTIVE | COMMUNITY
Start: 1900-01-01

## 2022-12-05 RX ORDER — MESALAMINE 800 MG/1
2 TABLETS TABLET, DELAYED RELEASE ORAL TWICE A DAY
Qty: 360 TABLET | Refills: 3 | Status: ACTIVE | COMMUNITY
Start: 2022-11-18 | End: 2023-11-13

## 2022-12-05 RX ORDER — COLESEVELAM HYDROCHLORIDE 625 MG/1
3 TABLETS AT BEDTIME TABLET, FILM COATED ORAL ONCE A DAY
Qty: 270 TABLET | Refills: 3 | Status: ACTIVE | COMMUNITY
Start: 2022-11-13

## 2022-12-05 RX ORDER — FAMOTIDINE 40 MG/1
1 TABLET AS NEEDED TABLET, FILM COATED ORAL TWICE A DAY
Qty: 180 TABLET | Refills: 3 | Status: ACTIVE | COMMUNITY
Start: 2021-11-18

## 2022-12-05 RX ORDER — AMITRIPTYLINE HYDROCHLORIDE 25 MG/1
TAKE 1 TABLET (25 MG) BY ORAL ROUTE ONCE DAILY AT BEDTIME TABLET, FILM COATED ORAL 1
Qty: 0 | Refills: 0 | Status: ACTIVE | COMMUNITY
Start: 1900-01-01

## 2022-12-05 RX ORDER — RIZATRIPTAN BENZOATE 10 MG
PRN MIGRAINES TABLET ORAL
Qty: 0 | Refills: 0 | Status: ACTIVE | COMMUNITY
Start: 1900-01-01

## 2022-12-05 RX ORDER — FAMOTIDINE 40 MG/1
1 TABLET AS NEEDED TABLET, FILM COATED ORAL TWICE A DAY
Qty: 180 TABLET | Refills: 3 | Status: ACTIVE | COMMUNITY
Start: 2020-08-07

## 2022-12-05 RX ORDER — COLESEVELAM HYDROCHLORIDE 625 MG/1
3 TABLETS TABLET, FILM COATED ORAL QHS
Qty: 270 | Refills: 3 | Status: ACTIVE | COMMUNITY
Start: 2021-11-18

## 2022-12-05 RX ORDER — FAMOTIDINE 40 MG/1
1 TABLET AS NEEDED TABLET, FILM COATED ORAL TWICE A DAY
Qty: 180 TABLET | Refills: 3 | Status: ACTIVE | COMMUNITY
Start: 2022-10-26

## 2022-12-05 RX ORDER — METOPROLOL SUCCINATE 50 MG/1
TAKE 1 TABLET (50 MG) BY ORAL ROUTE ONCE DAILY TABLET, EXTENDED RELEASE ORAL 1
Qty: 0 | Refills: 0 | Status: ACTIVE | COMMUNITY
Start: 1900-01-01

## 2022-12-28 ENCOUNTER — WEB ENCOUNTER (OUTPATIENT)
Dept: URBAN - METROPOLITAN AREA CLINIC 96 | Facility: CLINIC | Age: 62
End: 2022-12-28

## 2023-01-12 ENCOUNTER — WEB ENCOUNTER (OUTPATIENT)
Dept: URBAN - METROPOLITAN AREA CLINIC 96 | Facility: CLINIC | Age: 63
End: 2023-01-12

## 2023-01-17 ENCOUNTER — CLAIMS CREATED FROM THE CLAIM WINDOW (OUTPATIENT)
Dept: URBAN - METROPOLITAN AREA CLINIC 79 | Facility: CLINIC | Age: 63
End: 2023-01-17
Payer: COMMERCIAL

## 2023-01-17 ENCOUNTER — TELEPHONE ENCOUNTER (OUTPATIENT)
Dept: URBAN - METROPOLITAN AREA CLINIC 79 | Facility: CLINIC | Age: 63
End: 2023-01-17

## 2023-01-17 VITALS
WEIGHT: 182 LBS | RESPIRATION RATE: 20 BRPM | HEART RATE: 102 BPM | TEMPERATURE: 97.7 F | DIASTOLIC BLOOD PRESSURE: 68 MMHG | SYSTOLIC BLOOD PRESSURE: 110 MMHG | HEIGHT: 60 IN | BODY MASS INDEX: 35.73 KG/M2

## 2023-01-17 DIAGNOSIS — K50.80 CROHN'S COLITIS: ICD-10-CM

## 2023-01-17 LAB
QUANTIFERON CRITERIA: (no result)
QUANTIFERON INCUBATION: (no result)
QUANTIFERON MITOGEN VALUE: >10
QUANTIFERON NIL VALUE: 0.07
QUANTIFERON TB1 AG VALUE: 0.08
QUANTIFERON TB2 AG VALUE: 0.09
QUANTIFERON-TB GOLD PLUS: NEGATIVE

## 2023-01-17 PROCEDURE — 96413 CHEMO IV INFUSION 1 HR: CPT | Performed by: INTERNAL MEDICINE

## 2023-01-17 PROCEDURE — 96415 CHEMO IV INFUSION ADDL HR: CPT | Performed by: INTERNAL MEDICINE

## 2023-01-17 RX ORDER — COLESEVELAM HYDROCHLORIDE 625 MG/1
3 TABLETS TABLET, FILM COATED ORAL QHS
Qty: 270 | Refills: 3 | Status: ACTIVE | COMMUNITY
Start: 2021-11-18

## 2023-01-17 RX ORDER — LEVOTHYROXINE SODIUM 0.05 MG/1
TABLET ORAL
Qty: 0 | Refills: 0 | Status: ACTIVE | COMMUNITY
Start: 1900-01-01

## 2023-01-17 RX ORDER — RIZATRIPTAN BENZOATE 10 MG
PRN MIGRAINES TABLET ORAL
Qty: 0 | Refills: 0 | Status: ACTIVE | COMMUNITY
Start: 1900-01-01

## 2023-01-17 RX ORDER — FAMOTIDINE 40 MG/1
1 TABLET AS NEEDED TABLET, FILM COATED ORAL TWICE A DAY
Qty: 180 TABLET | Refills: 3 | Status: ACTIVE | COMMUNITY
Start: 2022-10-26

## 2023-01-17 RX ORDER — FAMOTIDINE 40 MG/1
1 TABLET AS NEEDED TABLET, FILM COATED ORAL TWICE A DAY
Qty: 180 TABLET | Refills: 3 | Status: ACTIVE | COMMUNITY
Start: 2021-11-18

## 2023-01-17 RX ORDER — COLESEVELAM HYDROCHLORIDE 625 MG/1
3 TABLETS AT BEDTIME TABLET, FILM COATED ORAL ONCE A DAY
Qty: 270 TABLET | Refills: 3 | Status: ACTIVE | COMMUNITY
Start: 2022-11-13

## 2023-01-17 RX ORDER — AMITRIPTYLINE HYDROCHLORIDE 25 MG/1
TAKE 1 TABLET (25 MG) BY ORAL ROUTE ONCE DAILY AT BEDTIME TABLET, FILM COATED ORAL 1
Qty: 0 | Refills: 0 | Status: ACTIVE | COMMUNITY
Start: 1900-01-01

## 2023-01-17 RX ORDER — METOPROLOL SUCCINATE 50 MG/1
TAKE 1 TABLET (50 MG) BY ORAL ROUTE ONCE DAILY TABLET, EXTENDED RELEASE ORAL 1
Qty: 0 | Refills: 0 | Status: ACTIVE | COMMUNITY
Start: 1900-01-01

## 2023-01-17 RX ORDER — FAMOTIDINE 40 MG/1
1 TABLET AS NEEDED TABLET, FILM COATED ORAL TWICE A DAY
Qty: 180 TABLET | Refills: 3 | Status: ACTIVE | COMMUNITY
Start: 2020-08-07

## 2023-01-17 RX ORDER — MESALAMINE 800 MG/1
2 TABLETS TABLET, DELAYED RELEASE ORAL TWICE A DAY
Qty: 360 TABLET | Refills: 3 | Status: ACTIVE | COMMUNITY
Start: 2022-11-18 | End: 2023-11-13

## 2023-01-18 PROBLEM — 71833008 CROHN'S DISEASE OF SMALL AND LARGE INTESTINES: Status: ACTIVE | Noted: 2023-01-18

## 2023-01-19 PROBLEM — 34000006: Status: ACTIVE | Noted: 2022-01-19

## 2023-02-22 ENCOUNTER — WEB ENCOUNTER (OUTPATIENT)
Dept: URBAN - METROPOLITAN AREA CLINIC 79 | Facility: CLINIC | Age: 63
End: 2023-02-22

## 2023-02-28 ENCOUNTER — OFFICE VISIT (OUTPATIENT)
Dept: URBAN - METROPOLITAN AREA CLINIC 79 | Facility: CLINIC | Age: 63
End: 2023-02-28
Payer: COMMERCIAL

## 2023-02-28 VITALS
SYSTOLIC BLOOD PRESSURE: 98 MMHG | BODY MASS INDEX: 35.73 KG/M2 | DIASTOLIC BLOOD PRESSURE: 77 MMHG | WEIGHT: 182 LBS | HEART RATE: 95 BPM | HEIGHT: 60 IN | RESPIRATION RATE: 18 BRPM | TEMPERATURE: 97.9 F

## 2023-02-28 DIAGNOSIS — K50.80 CROHN'S COLITIS: ICD-10-CM

## 2023-02-28 PROCEDURE — 96415 CHEMO IV INFUSION ADDL HR: CPT | Performed by: INTERNAL MEDICINE

## 2023-02-28 PROCEDURE — 96413 CHEMO IV INFUSION 1 HR: CPT | Performed by: INTERNAL MEDICINE

## 2023-02-28 RX ORDER — FAMOTIDINE 40 MG/1
1 TABLET AS NEEDED TABLET, FILM COATED ORAL TWICE A DAY
Qty: 180 TABLET | Refills: 3 | Status: ACTIVE | COMMUNITY
Start: 2022-10-26

## 2023-02-28 RX ORDER — FAMOTIDINE 40 MG/1
1 TABLET AS NEEDED TABLET, FILM COATED ORAL TWICE A DAY
Qty: 180 TABLET | Refills: 3 | Status: ACTIVE | COMMUNITY
Start: 2021-11-18

## 2023-02-28 RX ORDER — AMITRIPTYLINE HYDROCHLORIDE 25 MG/1
TAKE 1 TABLET (25 MG) BY ORAL ROUTE ONCE DAILY AT BEDTIME TABLET, FILM COATED ORAL 1
Qty: 0 | Refills: 0 | Status: ACTIVE | COMMUNITY
Start: 1900-01-01

## 2023-02-28 RX ORDER — METOPROLOL SUCCINATE 50 MG/1
TAKE 1 TABLET (50 MG) BY ORAL ROUTE ONCE DAILY TABLET, EXTENDED RELEASE ORAL 1
Qty: 0 | Refills: 0 | Status: ACTIVE | COMMUNITY
Start: 1900-01-01

## 2023-02-28 RX ORDER — COLESEVELAM HYDROCHLORIDE 625 MG/1
3 TABLETS TABLET, FILM COATED ORAL QHS
Qty: 270 | Refills: 3 | Status: ACTIVE | COMMUNITY
Start: 2021-11-18

## 2023-02-28 RX ORDER — MESALAMINE 800 MG/1
2 TABLETS TABLET, DELAYED RELEASE ORAL TWICE A DAY
Qty: 360 TABLET | Refills: 3 | Status: ACTIVE | COMMUNITY
Start: 2022-11-18 | End: 2023-11-13

## 2023-02-28 RX ORDER — COLESEVELAM HYDROCHLORIDE 625 MG/1
3 TABLETS AT BEDTIME TABLET, FILM COATED ORAL ONCE A DAY
Qty: 270 TABLET | Refills: 3 | Status: ACTIVE | COMMUNITY
Start: 2022-11-13

## 2023-02-28 RX ORDER — RIZATRIPTAN BENZOATE 10 MG
PRN MIGRAINES TABLET ORAL
Qty: 0 | Refills: 0 | Status: ACTIVE | COMMUNITY
Start: 1900-01-01

## 2023-02-28 RX ORDER — FAMOTIDINE 40 MG/1
1 TABLET AS NEEDED TABLET, FILM COATED ORAL TWICE A DAY
Qty: 180 TABLET | Refills: 3 | Status: ACTIVE | COMMUNITY
Start: 2020-08-07

## 2023-02-28 RX ORDER — LEVOTHYROXINE SODIUM 0.05 MG/1
TABLET ORAL
Qty: 0 | Refills: 0 | Status: ACTIVE | COMMUNITY
Start: 1900-01-01

## 2023-04-05 ENCOUNTER — WEB ENCOUNTER (OUTPATIENT)
Dept: URBAN - METROPOLITAN AREA CLINIC 79 | Facility: CLINIC | Age: 63
End: 2023-04-05

## 2023-04-11 ENCOUNTER — OFFICE VISIT (OUTPATIENT)
Dept: URBAN - METROPOLITAN AREA CLINIC 79 | Facility: CLINIC | Age: 63
End: 2023-04-11
Payer: COMMERCIAL

## 2023-04-11 VITALS
BODY MASS INDEX: 35.93 KG/M2 | DIASTOLIC BLOOD PRESSURE: 68 MMHG | SYSTOLIC BLOOD PRESSURE: 105 MMHG | TEMPERATURE: 97.9 F | WEIGHT: 183 LBS | HEIGHT: 60 IN | RESPIRATION RATE: 18 BRPM | HEART RATE: 75 BPM

## 2023-04-11 DIAGNOSIS — K50.80 CROHN'S COLITIS: ICD-10-CM

## 2023-04-11 PROCEDURE — 96415 CHEMO IV INFUSION ADDL HR: CPT | Performed by: INTERNAL MEDICINE

## 2023-04-11 PROCEDURE — 96413 CHEMO IV INFUSION 1 HR: CPT | Performed by: INTERNAL MEDICINE

## 2023-04-11 RX ORDER — FAMOTIDINE 40 MG/1
1 TABLET AS NEEDED TABLET, FILM COATED ORAL TWICE A DAY
Qty: 180 TABLET | Refills: 3 | Status: ACTIVE | COMMUNITY
Start: 2021-11-18

## 2023-04-11 RX ORDER — AMITRIPTYLINE HYDROCHLORIDE 25 MG/1
TAKE 1 TABLET (25 MG) BY ORAL ROUTE ONCE DAILY AT BEDTIME TABLET, FILM COATED ORAL 1
Qty: 0 | Refills: 0 | Status: ACTIVE | COMMUNITY
Start: 1900-01-01

## 2023-04-11 RX ORDER — MESALAMINE 800 MG/1
2 TABLETS TABLET, DELAYED RELEASE ORAL TWICE A DAY
Qty: 360 TABLET | Refills: 3 | Status: ACTIVE | COMMUNITY
Start: 2022-11-18 | End: 2023-11-13

## 2023-04-11 RX ORDER — LEVOTHYROXINE SODIUM 0.05 MG/1
TABLET ORAL
Qty: 0 | Refills: 0 | Status: ACTIVE | COMMUNITY
Start: 1900-01-01

## 2023-04-11 RX ORDER — RIZATRIPTAN BENZOATE 10 MG
PRN MIGRAINES TABLET ORAL
Qty: 0 | Refills: 0 | Status: ACTIVE | COMMUNITY
Start: 1900-01-01

## 2023-04-11 RX ORDER — FAMOTIDINE 40 MG/1
1 TABLET AS NEEDED TABLET, FILM COATED ORAL TWICE A DAY
Qty: 180 TABLET | Refills: 3 | Status: ACTIVE | COMMUNITY
Start: 2022-10-26

## 2023-04-11 RX ORDER — FAMOTIDINE 40 MG/1
1 TABLET AS NEEDED TABLET, FILM COATED ORAL TWICE A DAY
Qty: 180 TABLET | Refills: 3 | Status: ACTIVE | COMMUNITY
Start: 2020-08-07

## 2023-04-11 RX ORDER — COLESEVELAM HYDROCHLORIDE 625 MG/1
3 TABLETS AT BEDTIME TABLET, FILM COATED ORAL ONCE A DAY
Qty: 270 TABLET | Refills: 3 | Status: ACTIVE | COMMUNITY
Start: 2022-11-13

## 2023-04-11 RX ORDER — COLESEVELAM HYDROCHLORIDE 625 MG/1
3 TABLETS TABLET, FILM COATED ORAL QHS
Qty: 270 | Refills: 3 | Status: ACTIVE | COMMUNITY
Start: 2021-11-18

## 2023-04-11 RX ORDER — METOPROLOL SUCCINATE 50 MG/1
TAKE 1 TABLET (50 MG) BY ORAL ROUTE ONCE DAILY TABLET, EXTENDED RELEASE ORAL 1
Qty: 0 | Refills: 0 | Status: ACTIVE | COMMUNITY
Start: 1900-01-01

## 2023-05-19 ENCOUNTER — TELEPHONE ENCOUNTER (OUTPATIENT)
Dept: URBAN - METROPOLITAN AREA CLINIC 79 | Facility: CLINIC | Age: 63
End: 2023-05-19

## 2023-05-23 ENCOUNTER — TELEPHONE ENCOUNTER (OUTPATIENT)
Dept: URBAN - METROPOLITAN AREA CLINIC 96 | Facility: CLINIC | Age: 63
End: 2023-05-23

## 2023-05-23 ENCOUNTER — OFFICE VISIT (OUTPATIENT)
Dept: URBAN - METROPOLITAN AREA CLINIC 79 | Facility: CLINIC | Age: 63
End: 2023-05-23

## 2023-05-25 ENCOUNTER — OFFICE VISIT (OUTPATIENT)
Dept: URBAN - METROPOLITAN AREA TELEHEALTH 2 | Facility: TELEHEALTH | Age: 63
End: 2023-05-25
Payer: COMMERCIAL

## 2023-05-25 VITALS — BODY MASS INDEX: 35.34 KG/M2 | HEIGHT: 60 IN | WEIGHT: 180 LBS

## 2023-05-25 DIAGNOSIS — K50.80 CROHN'S COLITIS: ICD-10-CM

## 2023-05-25 DIAGNOSIS — K90.89 BILE SALT-INDUCED DIARRHEA: ICD-10-CM

## 2023-05-25 DIAGNOSIS — K21.9 GERD (GASTROESOPHAGEAL REFLUX DISEASE): ICD-10-CM

## 2023-05-25 DIAGNOSIS — K57.50 DIVERTICULOSIS OF BOTH SMALL AND LARGE INTESTINE WITHOUT BLEEDING: ICD-10-CM

## 2023-05-25 PROCEDURE — 99213 OFFICE O/P EST LOW 20 MIN: CPT | Performed by: INTERNAL MEDICINE

## 2023-05-25 RX ORDER — FAMOTIDINE 40 MG/1
1 TABLET AS NEEDED TABLET, FILM COATED ORAL TWICE A DAY
Qty: 180 TABLET | Refills: 3 | Status: ACTIVE | COMMUNITY
Start: 2021-11-18

## 2023-05-25 RX ORDER — METOPROLOL SUCCINATE 50 MG/1
TAKE 1 TABLET (50 MG) BY ORAL ROUTE ONCE DAILY TABLET, EXTENDED RELEASE ORAL 1
Qty: 0 | Refills: 0 | Status: ACTIVE | COMMUNITY
Start: 1900-01-01

## 2023-05-25 RX ORDER — FAMOTIDINE 40 MG/1
1 TABLET AS NEEDED TABLET, FILM COATED ORAL TWICE A DAY
Qty: 180 TABLET | Refills: 3 | Status: ACTIVE | COMMUNITY
Start: 2020-08-07

## 2023-05-25 RX ORDER — FAMOTIDINE 40 MG/1
1 TABLET AS NEEDED TABLET, FILM COATED ORAL TWICE A DAY
Qty: 180 TABLET | Refills: 3 | Status: ACTIVE | COMMUNITY
Start: 2022-10-26

## 2023-05-25 RX ORDER — LEVOTHYROXINE SODIUM 0.05 MG/1
TABLET ORAL
Qty: 0 | Refills: 0 | Status: ACTIVE | COMMUNITY
Start: 1900-01-01

## 2023-05-25 RX ORDER — ATORVASTATIN CALCIUM 40 MG/1
1 TABLET TABLET, FILM COATED ORAL ONCE A DAY
Status: ACTIVE | COMMUNITY

## 2023-05-25 RX ORDER — COLESEVELAM HYDROCHLORIDE 625 MG/1
3 TABLETS TABLET, FILM COATED ORAL QHS
Qty: 270 | Refills: 3 | Status: ACTIVE | COMMUNITY
Start: 2021-11-18

## 2023-05-25 RX ORDER — RIZATRIPTAN BENZOATE 10 MG
PRN MIGRAINES TABLET ORAL
Qty: 0 | Refills: 0 | Status: ACTIVE | COMMUNITY
Start: 1900-01-01

## 2023-05-25 RX ORDER — AMITRIPTYLINE HYDROCHLORIDE 25 MG/1
TAKE 1 TABLET (25 MG) BY ORAL ROUTE ONCE DAILY AT BEDTIME TABLET, FILM COATED ORAL 1
Qty: 0 | Refills: 0 | Status: ACTIVE | COMMUNITY
Start: 1900-01-01

## 2023-05-25 RX ORDER — COLESEVELAM HYDROCHLORIDE 625 MG/1
3 TABLETS AT BEDTIME TABLET, FILM COATED ORAL ONCE A DAY
Qty: 270 TABLET | Refills: 3 | Status: ACTIVE | COMMUNITY
Start: 2022-11-13

## 2023-05-25 RX ORDER — MESALAMINE 800 MG/1
2 TABLETS TABLET, DELAYED RELEASE ORAL TWICE A DAY
Qty: 360 TABLET | Refills: 3 | Status: ACTIVE | COMMUNITY
Start: 2022-11-18 | End: 2023-11-13

## 2023-05-25 NOTE — HPI-TODAY'S VISIT:
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 bms still erratic. ams were worse...now somewhat better. but. still has 2-3 formed bms in the am. after lunch, she stays "close to the restroom for an hour and a half" the evening is "perfect". on welchol. not full dose- only HS. gerd still doing well on pepcid 40mg bid

## 2023-05-30 ENCOUNTER — TELEPHONE ENCOUNTER (OUTPATIENT)
Dept: URBAN - METROPOLITAN AREA CLINIC 96 | Facility: CLINIC | Age: 63
End: 2023-05-30

## 2023-05-30 ENCOUNTER — DASHBOARD ENCOUNTERS (OUTPATIENT)
Age: 63
End: 2023-05-30

## 2023-05-30 ENCOUNTER — OFFICE VISIT (OUTPATIENT)
Dept: URBAN - METROPOLITAN AREA CLINIC 79 | Facility: CLINIC | Age: 63
End: 2023-05-30
Payer: COMMERCIAL

## 2023-05-30 VITALS
WEIGHT: 182 LBS | SYSTOLIC BLOOD PRESSURE: 112 MMHG | DIASTOLIC BLOOD PRESSURE: 68 MMHG | HEART RATE: 85 BPM | RESPIRATION RATE: 20 BRPM | TEMPERATURE: 97.9 F | BODY MASS INDEX: 35.73 KG/M2 | HEIGHT: 60 IN

## 2023-05-30 DIAGNOSIS — K50.80 CROHN'S COLITIS: ICD-10-CM

## 2023-05-30 PROCEDURE — 96415 CHEMO IV INFUSION ADDL HR: CPT | Performed by: INTERNAL MEDICINE

## 2023-05-30 PROCEDURE — 96413 CHEMO IV INFUSION 1 HR: CPT | Performed by: INTERNAL MEDICINE

## 2023-05-30 RX ORDER — FAMOTIDINE 40 MG/1
1 TABLET TABLET, FILM COATED ORAL TWICE A DAY
Qty: 180 TABLET | Refills: 3 | OUTPATIENT
Start: 2023-05-30

## 2023-05-30 RX ORDER — FAMOTIDINE 40 MG/1
1 TABLET AS NEEDED TABLET, FILM COATED ORAL TWICE A DAY
Qty: 180 TABLET | Refills: 3 | Status: ACTIVE | COMMUNITY
Start: 2020-08-07

## 2023-05-30 RX ORDER — AMITRIPTYLINE HYDROCHLORIDE 25 MG/1
TAKE 1 TABLET (25 MG) BY ORAL ROUTE ONCE DAILY AT BEDTIME TABLET, FILM COATED ORAL 1
Qty: 0 | Refills: 0 | Status: ACTIVE | COMMUNITY
Start: 1900-01-01

## 2023-05-30 RX ORDER — COLESEVELAM HYDROCHLORIDE 625 MG/1
3 TABLETS WITH MEALS TABLET, COATED ORAL TWICE A DAY
Qty: 540 TABLET | Refills: 3 | OUTPATIENT
Start: 2023-05-30

## 2023-05-30 RX ORDER — RIZATRIPTAN BENZOATE 10 MG
PRN MIGRAINES TABLET ORAL
Qty: 0 | Refills: 0 | Status: ACTIVE | COMMUNITY
Start: 1900-01-01

## 2023-05-30 RX ORDER — COLESEVELAM HYDROCHLORIDE 625 MG/1
3 TABLETS AT BEDTIME TABLET, FILM COATED ORAL ONCE A DAY
Qty: 270 TABLET | Refills: 3 | Status: ACTIVE | COMMUNITY
Start: 2022-11-13

## 2023-05-30 RX ORDER — MESALAMINE 800 MG/1
2 TABLETS TABLET, DELAYED RELEASE ORAL TWICE A DAY
Qty: 360 TABLET | Refills: 3 | Status: ACTIVE | COMMUNITY
Start: 2022-11-18 | End: 2023-11-13

## 2023-05-30 RX ORDER — LEVOTHYROXINE SODIUM 0.05 MG/1
TABLET ORAL
Qty: 0 | Refills: 0 | Status: ACTIVE | COMMUNITY
Start: 1900-01-01

## 2023-05-30 RX ORDER — FAMOTIDINE 40 MG/1
1 TABLET AS NEEDED TABLET, FILM COATED ORAL TWICE A DAY
Qty: 180 TABLET | Refills: 3 | Status: ACTIVE | COMMUNITY
Start: 2022-10-26

## 2023-05-30 RX ORDER — FAMOTIDINE 40 MG/1
1 TABLET AS NEEDED TABLET, FILM COATED ORAL TWICE A DAY
Qty: 180 TABLET | Refills: 3 | Status: ACTIVE | COMMUNITY
Start: 2021-11-18

## 2023-05-30 RX ORDER — METOPROLOL SUCCINATE 50 MG/1
TAKE 1 TABLET (50 MG) BY ORAL ROUTE ONCE DAILY TABLET, EXTENDED RELEASE ORAL 1
Qty: 0 | Refills: 0 | Status: ACTIVE | COMMUNITY
Start: 1900-01-01

## 2023-05-30 RX ORDER — MESALAMINE 800 MG/1
3 TABLETS TABLET, DELAYED RELEASE ORAL
Qty: 540 TABLET | Refills: 3 | OUTPATIENT
Start: 2023-05-30 | End: 2024-05-24

## 2023-05-30 RX ORDER — COLESEVELAM HYDROCHLORIDE 625 MG/1
3 TABLETS TABLET, FILM COATED ORAL QHS
Qty: 270 | Refills: 3 | Status: ACTIVE | COMMUNITY
Start: 2021-11-18

## 2023-05-30 RX ORDER — ATORVASTATIN CALCIUM 40 MG/1
1 TABLET TABLET, FILM COATED ORAL ONCE A DAY
Status: ACTIVE | COMMUNITY

## 2023-07-05 ENCOUNTER — WEB ENCOUNTER (OUTPATIENT)
Dept: URBAN - METROPOLITAN AREA CLINIC 79 | Facility: CLINIC | Age: 63
End: 2023-07-05

## 2023-07-06 ENCOUNTER — OFFICE VISIT (OUTPATIENT)
Dept: URBAN - METROPOLITAN AREA CLINIC 79 | Facility: CLINIC | Age: 63
End: 2023-07-06

## 2023-07-11 ENCOUNTER — OFFICE VISIT (OUTPATIENT)
Dept: URBAN - METROPOLITAN AREA CLINIC 79 | Facility: CLINIC | Age: 63
End: 2023-07-11
Payer: COMMERCIAL

## 2023-07-11 VITALS
BODY MASS INDEX: 36.12 KG/M2 | WEIGHT: 184 LBS | HEIGHT: 60 IN | HEART RATE: 94 BPM | SYSTOLIC BLOOD PRESSURE: 98 MMHG | TEMPERATURE: 97.9 F | DIASTOLIC BLOOD PRESSURE: 65 MMHG | RESPIRATION RATE: 20 BRPM

## 2023-07-11 DIAGNOSIS — K50.80 CROHN'S COLITIS: ICD-10-CM

## 2023-07-11 PROCEDURE — 96415 CHEMO IV INFUSION ADDL HR: CPT | Performed by: INTERNAL MEDICINE

## 2023-07-11 PROCEDURE — 96413 CHEMO IV INFUSION 1 HR: CPT | Performed by: INTERNAL MEDICINE

## 2023-07-11 RX ORDER — METOPROLOL SUCCINATE 50 MG/1
TAKE 1 TABLET (50 MG) BY ORAL ROUTE ONCE DAILY TABLET, EXTENDED RELEASE ORAL 1
Qty: 0 | Refills: 0 | Status: ACTIVE | COMMUNITY
Start: 1900-01-01

## 2023-07-11 RX ORDER — FAMOTIDINE 40 MG/1
1 TABLET AS NEEDED TABLET, FILM COATED ORAL TWICE A DAY
Qty: 180 TABLET | Refills: 3 | Status: ACTIVE | COMMUNITY
Start: 2020-08-07

## 2023-07-11 RX ORDER — COLESEVELAM HYDROCHLORIDE 625 MG/1
3 TABLETS TABLET, FILM COATED ORAL QHS
Qty: 270 | Refills: 3 | Status: ACTIVE | COMMUNITY
Start: 2021-11-18

## 2023-07-11 RX ORDER — FAMOTIDINE 40 MG/1
1 TABLET AS NEEDED TABLET, FILM COATED ORAL TWICE A DAY
Qty: 180 TABLET | Refills: 3 | Status: ACTIVE | COMMUNITY
Start: 2022-10-26

## 2023-07-11 RX ORDER — COLESEVELAM HYDROCHLORIDE 625 MG/1
3 TABLETS AT BEDTIME TABLET, FILM COATED ORAL ONCE A DAY
Qty: 270 TABLET | Refills: 3 | Status: ACTIVE | COMMUNITY
Start: 2022-11-13

## 2023-07-11 RX ORDER — COLESEVELAM HYDROCHLORIDE 625 MG/1
3 TABLETS WITH MEALS TABLET, COATED ORAL TWICE A DAY
Qty: 540 TABLET | Refills: 3 | Status: ACTIVE | COMMUNITY
Start: 2023-05-30

## 2023-07-11 RX ORDER — AMITRIPTYLINE HYDROCHLORIDE 25 MG/1
TAKE 1 TABLET (25 MG) BY ORAL ROUTE ONCE DAILY AT BEDTIME TABLET, FILM COATED ORAL 1
Qty: 0 | Refills: 0 | Status: ACTIVE | COMMUNITY
Start: 1900-01-01

## 2023-07-11 RX ORDER — FAMOTIDINE 40 MG/1
1 TABLET TABLET, FILM COATED ORAL TWICE A DAY
Qty: 180 TABLET | Refills: 3 | Status: ACTIVE | COMMUNITY
Start: 2023-05-30

## 2023-07-11 RX ORDER — LEVOTHYROXINE SODIUM 0.05 MG/1
TABLET ORAL
Qty: 0 | Refills: 0 | Status: ACTIVE | COMMUNITY
Start: 1900-01-01

## 2023-07-11 RX ORDER — MESALAMINE 800 MG/1
2 TABLETS TABLET, DELAYED RELEASE ORAL TWICE A DAY
Qty: 360 TABLET | Refills: 3 | Status: ACTIVE | COMMUNITY
Start: 2022-11-18 | End: 2023-11-13

## 2023-07-11 RX ORDER — FAMOTIDINE 40 MG/1
1 TABLET AS NEEDED TABLET, FILM COATED ORAL TWICE A DAY
Qty: 180 TABLET | Refills: 3 | Status: ACTIVE | COMMUNITY
Start: 2021-11-18

## 2023-07-11 RX ORDER — RIZATRIPTAN BENZOATE 10 MG
PRN MIGRAINES TABLET ORAL
Qty: 0 | Refills: 0 | Status: ACTIVE | COMMUNITY
Start: 1900-01-01

## 2023-07-11 RX ORDER — MESALAMINE 800 MG/1
3 TABLETS TABLET, DELAYED RELEASE ORAL
Qty: 540 TABLET | Refills: 3 | Status: ACTIVE | COMMUNITY
Start: 2023-05-30 | End: 2024-05-24

## 2023-07-11 RX ORDER — ATORVASTATIN CALCIUM 40 MG/1
1 TABLET TABLET, FILM COATED ORAL ONCE A DAY
Status: ACTIVE | COMMUNITY

## 2023-08-14 ENCOUNTER — OFFICE VISIT (OUTPATIENT)
Dept: URBAN - METROPOLITAN AREA TELEHEALTH 2 | Facility: TELEHEALTH | Age: 63
End: 2023-08-14

## 2023-08-16 ENCOUNTER — WEB ENCOUNTER (OUTPATIENT)
Dept: URBAN - METROPOLITAN AREA CLINIC 79 | Facility: CLINIC | Age: 63
End: 2023-08-16

## 2023-08-17 ENCOUNTER — OFFICE VISIT (OUTPATIENT)
Dept: URBAN - METROPOLITAN AREA CLINIC 79 | Facility: CLINIC | Age: 63
End: 2023-08-17

## 2023-08-22 ENCOUNTER — OFFICE VISIT (OUTPATIENT)
Dept: URBAN - METROPOLITAN AREA CLINIC 79 | Facility: CLINIC | Age: 63
End: 2023-08-22
Payer: COMMERCIAL

## 2023-08-22 VITALS
RESPIRATION RATE: 20 BRPM | TEMPERATURE: 97.7 F | HEART RATE: 91 BPM | WEIGHT: 184 LBS | DIASTOLIC BLOOD PRESSURE: 67 MMHG | SYSTOLIC BLOOD PRESSURE: 104 MMHG | HEIGHT: 60 IN | BODY MASS INDEX: 36.12 KG/M2

## 2023-08-22 DIAGNOSIS — K50.80 CROHN'S COLITIS: ICD-10-CM

## 2023-08-22 PROCEDURE — 96413 CHEMO IV INFUSION 1 HR: CPT | Performed by: INTERNAL MEDICINE

## 2023-08-22 PROCEDURE — 96415 CHEMO IV INFUSION ADDL HR: CPT | Performed by: INTERNAL MEDICINE

## 2023-08-22 RX ORDER — COLESEVELAM HYDROCHLORIDE 625 MG/1
3 TABLETS WITH MEALS TABLET, COATED ORAL TWICE A DAY
Qty: 540 TABLET | Refills: 3 | Status: ACTIVE | COMMUNITY
Start: 2023-05-30

## 2023-08-22 RX ORDER — RIZATRIPTAN BENZOATE 10 MG
PRN MIGRAINES TABLET ORAL
Qty: 0 | Refills: 0 | Status: ACTIVE | COMMUNITY
Start: 1900-01-01

## 2023-08-22 RX ORDER — FAMOTIDINE 40 MG/1
1 TABLET AS NEEDED TABLET, FILM COATED ORAL TWICE A DAY
Qty: 180 TABLET | Refills: 3 | Status: ACTIVE | COMMUNITY
Start: 2020-08-07

## 2023-08-22 RX ORDER — MESALAMINE 800 MG/1
2 TABLETS TABLET, DELAYED RELEASE ORAL TWICE A DAY
Qty: 360 TABLET | Refills: 3 | Status: ACTIVE | COMMUNITY
Start: 2022-11-18 | End: 2023-11-13

## 2023-08-22 RX ORDER — ATORVASTATIN CALCIUM 40 MG/1
1 TABLET TABLET, FILM COATED ORAL ONCE A DAY
Status: ACTIVE | COMMUNITY

## 2023-08-22 RX ORDER — METOPROLOL SUCCINATE 50 MG/1
TAKE 1 TABLET (50 MG) BY ORAL ROUTE ONCE DAILY TABLET, EXTENDED RELEASE ORAL 1
Qty: 0 | Refills: 0 | Status: ACTIVE | COMMUNITY
Start: 1900-01-01

## 2023-08-22 RX ORDER — FAMOTIDINE 40 MG/1
1 TABLET TABLET, FILM COATED ORAL TWICE A DAY
Qty: 180 TABLET | Refills: 3 | Status: ACTIVE | COMMUNITY
Start: 2023-05-30

## 2023-08-22 RX ORDER — FAMOTIDINE 40 MG/1
1 TABLET AS NEEDED TABLET, FILM COATED ORAL TWICE A DAY
Qty: 180 TABLET | Refills: 3 | Status: ACTIVE | COMMUNITY
Start: 2021-11-18

## 2023-08-22 RX ORDER — COLESEVELAM HYDROCHLORIDE 625 MG/1
3 TABLETS AT BEDTIME TABLET, FILM COATED ORAL ONCE A DAY
Qty: 270 TABLET | Refills: 3 | Status: ACTIVE | COMMUNITY
Start: 2022-11-13

## 2023-08-22 RX ORDER — LEVOTHYROXINE SODIUM 0.05 MG/1
TABLET ORAL
Qty: 0 | Refills: 0 | Status: ACTIVE | COMMUNITY
Start: 1900-01-01

## 2023-08-22 RX ORDER — FAMOTIDINE 40 MG/1
1 TABLET AS NEEDED TABLET, FILM COATED ORAL TWICE A DAY
Qty: 180 TABLET | Refills: 3 | Status: ACTIVE | COMMUNITY
Start: 2022-10-26

## 2023-08-22 RX ORDER — MESALAMINE 800 MG/1
3 TABLETS TABLET, DELAYED RELEASE ORAL
Qty: 540 TABLET | Refills: 3 | Status: ACTIVE | COMMUNITY
Start: 2023-05-30 | End: 2024-05-24

## 2023-08-22 RX ORDER — AMITRIPTYLINE HYDROCHLORIDE 25 MG/1
TAKE 1 TABLET (25 MG) BY ORAL ROUTE ONCE DAILY AT BEDTIME TABLET, FILM COATED ORAL 1
Qty: 0 | Refills: 0 | Status: ACTIVE | COMMUNITY
Start: 1900-01-01

## 2023-08-22 RX ORDER — COLESEVELAM HYDROCHLORIDE 625 MG/1
3 TABLETS TABLET, FILM COATED ORAL QHS
Qty: 270 | Refills: 3 | Status: ACTIVE | COMMUNITY
Start: 2021-11-18

## 2023-09-25 ENCOUNTER — TELEPHONE ENCOUNTER (OUTPATIENT)
Dept: URBAN - METROPOLITAN AREA CLINIC 96 | Facility: CLINIC | Age: 63
End: 2023-09-25

## 2023-09-25 RX ORDER — SODIUM, POTASSIUM,MAG SULFATES 17.5-3.13G
354ML SOLUTION, RECONSTITUTED, ORAL ORAL TWICE A DAY
Qty: 354 ML | Refills: 0 | OUTPATIENT
Start: 2023-10-01 | End: 2023-10-02

## 2023-10-03 ENCOUNTER — OFFICE VISIT (OUTPATIENT)
Dept: URBAN - METROPOLITAN AREA CLINIC 79 | Facility: CLINIC | Age: 63
End: 2023-10-03
Payer: COMMERCIAL

## 2023-10-03 VITALS
HEIGHT: 60 IN | WEIGHT: 183 LBS | DIASTOLIC BLOOD PRESSURE: 76 MMHG | HEART RATE: 101 BPM | BODY MASS INDEX: 35.93 KG/M2 | RESPIRATION RATE: 20 BRPM | SYSTOLIC BLOOD PRESSURE: 117 MMHG | TEMPERATURE: 97.7 F

## 2023-10-03 DIAGNOSIS — K50.80 CROHN'S COLITIS: ICD-10-CM

## 2023-10-03 PROCEDURE — 96415 CHEMO IV INFUSION ADDL HR: CPT | Performed by: INTERNAL MEDICINE

## 2023-10-03 PROCEDURE — 96413 CHEMO IV INFUSION 1 HR: CPT | Performed by: INTERNAL MEDICINE

## 2023-10-03 RX ORDER — MESALAMINE 800 MG/1
2 TABLETS TABLET, DELAYED RELEASE ORAL TWICE A DAY
Qty: 360 TABLET | Refills: 3 | Status: ACTIVE | COMMUNITY
Start: 2022-11-18 | End: 2023-11-13

## 2023-10-03 RX ORDER — METOPROLOL SUCCINATE 50 MG/1
TAKE 1 TABLET (50 MG) BY ORAL ROUTE ONCE DAILY TABLET, EXTENDED RELEASE ORAL 1
Qty: 0 | Refills: 0 | Status: ACTIVE | COMMUNITY
Start: 1900-01-01

## 2023-10-03 RX ORDER — FAMOTIDINE 40 MG/1
1 TABLET AS NEEDED TABLET, FILM COATED ORAL TWICE A DAY
Qty: 180 TABLET | Refills: 3 | Status: ACTIVE | COMMUNITY
Start: 2020-08-07

## 2023-10-03 RX ORDER — ATORVASTATIN CALCIUM 40 MG/1
1 TABLET TABLET, FILM COATED ORAL ONCE A DAY
Status: ACTIVE | COMMUNITY

## 2023-10-03 RX ORDER — COLESEVELAM HYDROCHLORIDE 625 MG/1
3 TABLETS AT BEDTIME TABLET, FILM COATED ORAL ONCE A DAY
Qty: 270 TABLET | Refills: 3 | Status: ACTIVE | COMMUNITY
Start: 2022-11-13

## 2023-10-03 RX ORDER — MESALAMINE 800 MG/1
3 TABLETS TABLET, DELAYED RELEASE ORAL
Qty: 540 TABLET | Refills: 3 | Status: ACTIVE | COMMUNITY
Start: 2023-05-30 | End: 2024-05-24

## 2023-10-03 RX ORDER — LEVOTHYROXINE SODIUM 0.05 MG/1
TABLET ORAL
Qty: 0 | Refills: 0 | Status: ACTIVE | COMMUNITY
Start: 1900-01-01

## 2023-10-03 RX ORDER — COLESEVELAM HYDROCHLORIDE 625 MG/1
3 TABLETS TABLET, FILM COATED ORAL QHS
Qty: 270 | Refills: 3 | Status: ACTIVE | COMMUNITY
Start: 2021-11-18

## 2023-10-03 RX ORDER — AMITRIPTYLINE HYDROCHLORIDE 25 MG/1
TAKE 1 TABLET (25 MG) BY ORAL ROUTE ONCE DAILY AT BEDTIME TABLET, FILM COATED ORAL 1
Qty: 0 | Refills: 0 | Status: ACTIVE | COMMUNITY
Start: 1900-01-01

## 2023-10-03 RX ORDER — RIZATRIPTAN BENZOATE 10 MG
PRN MIGRAINES TABLET ORAL
Qty: 0 | Refills: 0 | Status: ACTIVE | COMMUNITY
Start: 1900-01-01

## 2023-10-03 RX ORDER — FAMOTIDINE 40 MG/1
1 TABLET TABLET, FILM COATED ORAL TWICE A DAY
Qty: 180 TABLET | Refills: 3 | Status: ACTIVE | COMMUNITY
Start: 2023-05-30

## 2023-10-03 RX ORDER — FAMOTIDINE 40 MG/1
1 TABLET AS NEEDED TABLET, FILM COATED ORAL TWICE A DAY
Qty: 180 TABLET | Refills: 3 | Status: ACTIVE | COMMUNITY
Start: 2021-11-18

## 2023-10-03 RX ORDER — FAMOTIDINE 40 MG/1
1 TABLET AS NEEDED TABLET, FILM COATED ORAL TWICE A DAY
Qty: 180 TABLET | Refills: 3 | Status: ACTIVE | COMMUNITY
Start: 2022-10-26

## 2023-10-03 RX ORDER — COLESEVELAM HYDROCHLORIDE 625 MG/1
3 TABLETS WITH MEALS TABLET, COATED ORAL TWICE A DAY
Qty: 540 TABLET | Refills: 3 | Status: ACTIVE | COMMUNITY
Start: 2023-05-30

## 2023-10-23 ENCOUNTER — WEB ENCOUNTER (OUTPATIENT)
Dept: URBAN - METROPOLITAN AREA CLINIC 96 | Facility: CLINIC | Age: 63
End: 2023-10-23

## 2023-10-23 RX ORDER — MESALAMINE 800 MG/1
2 TABLETS TABLET, DELAYED RELEASE ORAL TWICE A DAY
Qty: 360 TABLET | Refills: 3 | OUTPATIENT
Start: 2023-10-31 | End: 2024-10-25

## 2023-10-30 ENCOUNTER — WEB ENCOUNTER (OUTPATIENT)
Dept: URBAN - METROPOLITAN AREA CLINIC 96 | Facility: CLINIC | Age: 63
End: 2023-10-30

## 2023-10-31 ENCOUNTER — WEB ENCOUNTER (OUTPATIENT)
Dept: URBAN - METROPOLITAN AREA CLINIC 96 | Facility: CLINIC | Age: 63
End: 2023-10-31

## 2023-10-31 RX ORDER — MESALAMINE 800 MG/1
3 TABLETS TABLET, DELAYED RELEASE ORAL TWICE A DAY
Qty: 540 TABLET | Refills: 3 | OUTPATIENT
Start: 2023-10-31 | End: 2024-10-25

## 2023-11-13 ENCOUNTER — TELEPHONE ENCOUNTER (OUTPATIENT)
Dept: URBAN - METROPOLITAN AREA CLINIC 18 | Facility: CLINIC | Age: 63
End: 2023-11-13

## 2023-11-13 RX ORDER — INFLIXIMAB 100 MG/10ML
AS DIRECTED INJECTION, POWDER, LYOPHILIZED, FOR SOLUTION INTRAVENOUS
OUTPATIENT
Start: 2023-12-01

## 2023-11-14 ENCOUNTER — OFFICE VISIT (OUTPATIENT)
Dept: URBAN - METROPOLITAN AREA CLINIC 79 | Facility: CLINIC | Age: 63
End: 2023-11-14
Payer: COMMERCIAL

## 2023-11-14 VITALS
HEART RATE: 78 BPM | TEMPERATURE: 97.5 F | SYSTOLIC BLOOD PRESSURE: 125 MMHG | HEIGHT: 60 IN | WEIGHT: 181 LBS | BODY MASS INDEX: 35.53 KG/M2 | DIASTOLIC BLOOD PRESSURE: 82 MMHG | RESPIRATION RATE: 18 BRPM

## 2023-11-14 DIAGNOSIS — K50.80 CROHN'S COLITIS: ICD-10-CM

## 2023-11-14 PROCEDURE — 96413 CHEMO IV INFUSION 1 HR: CPT | Performed by: INTERNAL MEDICINE

## 2023-11-14 PROCEDURE — 96415 CHEMO IV INFUSION ADDL HR: CPT | Performed by: INTERNAL MEDICINE

## 2023-11-14 RX ORDER — FAMOTIDINE 40 MG/1
1 TABLET AS NEEDED TABLET, FILM COATED ORAL TWICE A DAY
Qty: 180 TABLET | Refills: 3 | Status: ACTIVE | COMMUNITY
Start: 2022-10-26

## 2023-11-14 RX ORDER — METOPROLOL SUCCINATE 50 MG/1
TAKE 1 TABLET (50 MG) BY ORAL ROUTE ONCE DAILY TABLET, EXTENDED RELEASE ORAL 1
Qty: 0 | Refills: 0 | Status: ACTIVE | COMMUNITY
Start: 1900-01-01

## 2023-11-14 RX ORDER — COLESEVELAM HYDROCHLORIDE 625 MG/1
3 TABLETS AT BEDTIME TABLET, FILM COATED ORAL ONCE A DAY
Qty: 270 TABLET | Refills: 3 | Status: ACTIVE | COMMUNITY
Start: 2022-11-13

## 2023-11-14 RX ORDER — MESALAMINE 800 MG/1
3 TABLETS TABLET, DELAYED RELEASE ORAL TWICE A DAY
Qty: 540 TABLET | Refills: 3 | Status: ACTIVE | COMMUNITY
Start: 2023-10-31 | End: 2024-10-25

## 2023-11-14 RX ORDER — COLESEVELAM HYDROCHLORIDE 625 MG/1
3 TABLETS TABLET, FILM COATED ORAL QHS
Qty: 270 | Refills: 3 | Status: ACTIVE | COMMUNITY
Start: 2021-11-18

## 2023-11-14 RX ORDER — RIZATRIPTAN BENZOATE 10 MG
PRN MIGRAINES TABLET ORAL
Qty: 0 | Refills: 0 | Status: ACTIVE | COMMUNITY
Start: 1900-01-01

## 2023-11-14 RX ORDER — COLESEVELAM HYDROCHLORIDE 625 MG/1
3 TABLETS WITH MEALS TABLET, COATED ORAL TWICE A DAY
Qty: 540 TABLET | Refills: 3 | Status: ACTIVE | COMMUNITY
Start: 2023-05-30

## 2023-11-14 RX ORDER — AMITRIPTYLINE HYDROCHLORIDE 25 MG/1
TAKE 1 TABLET (25 MG) BY ORAL ROUTE ONCE DAILY AT BEDTIME TABLET, FILM COATED ORAL 1
Qty: 0 | Refills: 0 | Status: ACTIVE | COMMUNITY
Start: 1900-01-01

## 2023-11-14 RX ORDER — FAMOTIDINE 40 MG/1
1 TABLET AS NEEDED TABLET, FILM COATED ORAL TWICE A DAY
Qty: 180 TABLET | Refills: 3 | Status: ACTIVE | COMMUNITY
Start: 2020-08-07

## 2023-11-14 RX ORDER — FAMOTIDINE 40 MG/1
1 TABLET AS NEEDED TABLET, FILM COATED ORAL TWICE A DAY
Qty: 180 TABLET | Refills: 3 | Status: ACTIVE | COMMUNITY
Start: 2021-11-18

## 2023-11-14 RX ORDER — LEVOTHYROXINE SODIUM 0.05 MG/1
TABLET ORAL
Qty: 0 | Refills: 0 | Status: ACTIVE | COMMUNITY
Start: 1900-01-01

## 2023-11-14 RX ORDER — MESALAMINE 800 MG/1
2 TABLETS TABLET, DELAYED RELEASE ORAL TWICE A DAY
Qty: 360 TABLET | Refills: 3 | Status: ACTIVE | COMMUNITY
Start: 2023-10-31 | End: 2024-10-25

## 2023-11-14 RX ORDER — FAMOTIDINE 40 MG/1
1 TABLET TABLET, FILM COATED ORAL TWICE A DAY
Qty: 180 TABLET | Refills: 3 | Status: ACTIVE | COMMUNITY
Start: 2023-05-30

## 2023-11-14 RX ORDER — ATORVASTATIN CALCIUM 40 MG/1
1 TABLET TABLET, FILM COATED ORAL ONCE A DAY
Status: ACTIVE | COMMUNITY

## 2023-11-14 RX ORDER — MESALAMINE 800 MG/1
3 TABLETS TABLET, DELAYED RELEASE ORAL
Qty: 540 TABLET | Refills: 3 | Status: ACTIVE | COMMUNITY
Start: 2023-05-30 | End: 2024-05-24

## 2023-12-04 ENCOUNTER — OUT OF OFFICE VISIT (OUTPATIENT)
Dept: URBAN - METROPOLITAN AREA SURGERY CENTER 18 | Facility: SURGERY CENTER | Age: 63
End: 2023-12-04
Payer: COMMERCIAL

## 2023-12-04 ENCOUNTER — CLAIMS CREATED FROM THE CLAIM WINDOW (OUTPATIENT)
Dept: URBAN - METROPOLITAN AREA CLINIC 4 | Facility: CLINIC | Age: 63
End: 2023-12-04
Payer: COMMERCIAL

## 2023-12-04 DIAGNOSIS — K31.7 BENIGN GASTRIC POLYP: ICD-10-CM

## 2023-12-04 DIAGNOSIS — R19.5 STOOL CONTENTS FINDING, ABNORMAL: ICD-10-CM

## 2023-12-04 DIAGNOSIS — K50.90 ABDOMINAL PAIN DESPITE THERAPY FOR CROHN'S DISEASE: ICD-10-CM

## 2023-12-04 DIAGNOSIS — K21.9 GASTRO-ESOPHAGEAL REFLUX DISEASE WITHOUT ESOPHAGITIS: ICD-10-CM

## 2023-12-04 DIAGNOSIS — K31.7 MULTIPLE GASTRIC POLYPS: ICD-10-CM

## 2023-12-04 DIAGNOSIS — K57.30 DIVERTICULOSIS OF THE COLON: ICD-10-CM

## 2023-12-04 DIAGNOSIS — K21.9 ACID REFLUX: ICD-10-CM

## 2023-12-04 DIAGNOSIS — K22.9 IRREGULAR Z LINE OF ESOPHAGUS: ICD-10-CM

## 2023-12-04 PROCEDURE — 43239 EGD BIOPSY SINGLE/MULTIPLE: CPT | Performed by: INTERNAL MEDICINE

## 2023-12-04 PROCEDURE — 00813 ANES UPR LWR GI NDSC PX: CPT | Performed by: NURSE ANESTHETIST, CERTIFIED REGISTERED

## 2023-12-04 PROCEDURE — 45378 DIAGNOSTIC COLONOSCOPY: CPT | Performed by: INTERNAL MEDICINE

## 2023-12-04 PROCEDURE — G8907 PT DOC NO EVENTS ON DISCHARG: HCPCS | Performed by: INTERNAL MEDICINE

## 2023-12-04 PROCEDURE — 88305 TISSUE EXAM BY PATHOLOGIST: CPT | Performed by: PATHOLOGY

## 2023-12-04 RX ORDER — RIZATRIPTAN BENZOATE 10 MG
PRN MIGRAINES TABLET ORAL
Qty: 0 | Refills: 0 | Status: ACTIVE | COMMUNITY
Start: 1900-01-01

## 2023-12-04 RX ORDER — AMITRIPTYLINE HYDROCHLORIDE 25 MG/1
TAKE 1 TABLET (25 MG) BY ORAL ROUTE ONCE DAILY AT BEDTIME TABLET, FILM COATED ORAL 1
Qty: 0 | Refills: 0 | Status: ACTIVE | COMMUNITY
Start: 1900-01-01

## 2023-12-04 RX ORDER — COLESEVELAM HYDROCHLORIDE 625 MG/1
3 TABLETS WITH MEALS TABLET, COATED ORAL TWICE A DAY
Qty: 540 TABLET | Refills: 3 | Status: ACTIVE | COMMUNITY
Start: 2023-05-30

## 2023-12-04 RX ORDER — FAMOTIDINE 40 MG/1
1 TABLET AS NEEDED TABLET, FILM COATED ORAL TWICE A DAY
Qty: 180 TABLET | Refills: 3 | Status: ACTIVE | COMMUNITY
Start: 2022-10-26

## 2023-12-04 RX ORDER — FAMOTIDINE 40 MG/1
1 TABLET AS NEEDED TABLET, FILM COATED ORAL TWICE A DAY
Qty: 180 TABLET | Refills: 3 | Status: ACTIVE | COMMUNITY
Start: 2020-08-07

## 2023-12-04 RX ORDER — COLESEVELAM HYDROCHLORIDE 625 MG/1
3 TABLETS TABLET, FILM COATED ORAL QHS
Qty: 270 | Refills: 3 | Status: ACTIVE | COMMUNITY
Start: 2021-11-18

## 2023-12-04 RX ORDER — FAMOTIDINE 40 MG/1
1 TABLET TABLET, FILM COATED ORAL TWICE A DAY
Qty: 180 TABLET | Refills: 3 | Status: ACTIVE | COMMUNITY
Start: 2023-05-30

## 2023-12-04 RX ORDER — METOPROLOL SUCCINATE 50 MG/1
TAKE 1 TABLET (50 MG) BY ORAL ROUTE ONCE DAILY TABLET, EXTENDED RELEASE ORAL 1
Qty: 0 | Refills: 0 | Status: ACTIVE | COMMUNITY
Start: 1900-01-01

## 2023-12-04 RX ORDER — MESALAMINE 800 MG/1
3 TABLETS TABLET, DELAYED RELEASE ORAL TWICE A DAY
Qty: 540 TABLET | Refills: 3 | Status: ACTIVE | COMMUNITY
Start: 2023-10-31 | End: 2024-10-25

## 2023-12-04 RX ORDER — FAMOTIDINE 40 MG/1
1 TABLET AS NEEDED TABLET, FILM COATED ORAL TWICE A DAY
Qty: 180 TABLET | Refills: 3 | Status: ACTIVE | COMMUNITY
Start: 2021-11-18

## 2023-12-04 RX ORDER — LEVOTHYROXINE SODIUM 0.05 MG/1
TABLET ORAL
Qty: 0 | Refills: 0 | Status: ACTIVE | COMMUNITY
Start: 1900-01-01

## 2023-12-04 RX ORDER — MESALAMINE 800 MG/1
3 TABLETS TABLET, DELAYED RELEASE ORAL
Qty: 540 TABLET | Refills: 3 | Status: ACTIVE | COMMUNITY
Start: 2023-05-30 | End: 2024-05-24

## 2023-12-04 RX ORDER — INFLIXIMAB 100 MG/10ML
AS DIRECTED INJECTION, POWDER, LYOPHILIZED, FOR SOLUTION INTRAVENOUS
Status: ACTIVE | COMMUNITY
Start: 2023-12-01

## 2023-12-04 RX ORDER — MESALAMINE 800 MG/1
2 TABLETS TABLET, DELAYED RELEASE ORAL TWICE A DAY
Qty: 360 TABLET | Refills: 3 | Status: ACTIVE | COMMUNITY
Start: 2023-10-31 | End: 2024-10-25

## 2023-12-04 RX ORDER — ATORVASTATIN CALCIUM 40 MG/1
1 TABLET TABLET, FILM COATED ORAL ONCE A DAY
Status: ACTIVE | COMMUNITY

## 2023-12-04 RX ORDER — COLESEVELAM HYDROCHLORIDE 625 MG/1
3 TABLETS AT BEDTIME TABLET, FILM COATED ORAL ONCE A DAY
Qty: 270 TABLET | Refills: 3 | Status: ACTIVE | COMMUNITY
Start: 2022-11-13

## 2023-12-12 ENCOUNTER — WEB ENCOUNTER (OUTPATIENT)
Dept: URBAN - METROPOLITAN AREA CLINIC 96 | Facility: CLINIC | Age: 63
End: 2023-12-12

## 2023-12-26 ENCOUNTER — OFFICE VISIT (OUTPATIENT)
Dept: URBAN - METROPOLITAN AREA CLINIC 79 | Facility: CLINIC | Age: 63
End: 2023-12-26

## 2023-12-28 ENCOUNTER — OFFICE VISIT (OUTPATIENT)
Dept: URBAN - METROPOLITAN AREA CLINIC 79 | Facility: CLINIC | Age: 63
End: 2023-12-28

## 2023-12-29 ENCOUNTER — OFFICE VISIT (OUTPATIENT)
Dept: URBAN - METROPOLITAN AREA CLINIC 79 | Facility: CLINIC | Age: 63
End: 2023-12-29
Payer: COMMERCIAL

## 2023-12-29 ENCOUNTER — LAB OUTSIDE AN ENCOUNTER (OUTPATIENT)
Dept: URBAN - METROPOLITAN AREA CLINIC 96 | Facility: CLINIC | Age: 63
End: 2023-12-29

## 2023-12-29 VITALS
DIASTOLIC BLOOD PRESSURE: 87 MMHG | HEIGHT: 60 IN | HEART RATE: 103 BPM | WEIGHT: 186 LBS | TEMPERATURE: 97.8 F | RESPIRATION RATE: 18 BRPM | SYSTOLIC BLOOD PRESSURE: 107 MMHG | BODY MASS INDEX: 36.52 KG/M2

## 2023-12-29 DIAGNOSIS — K50.80 CROHN'S COLITIS: ICD-10-CM

## 2023-12-29 PROCEDURE — 96413 CHEMO IV INFUSION 1 HR: CPT | Performed by: INTERNAL MEDICINE

## 2023-12-29 PROCEDURE — 96415 CHEMO IV INFUSION ADDL HR: CPT | Performed by: INTERNAL MEDICINE

## 2023-12-29 RX ORDER — AMITRIPTYLINE HYDROCHLORIDE 25 MG/1
TAKE 1 TABLET (25 MG) BY ORAL ROUTE ONCE DAILY AT BEDTIME TABLET, FILM COATED ORAL 1
Qty: 0 | Refills: 0 | Status: ACTIVE | COMMUNITY
Start: 1900-01-01

## 2023-12-29 RX ORDER — COLESEVELAM HYDROCHLORIDE 625 MG/1
3 TABLETS AT BEDTIME TABLET, FILM COATED ORAL ONCE A DAY
Qty: 270 TABLET | Refills: 3 | Status: ACTIVE | COMMUNITY
Start: 2022-11-13

## 2023-12-29 RX ORDER — METOPROLOL SUCCINATE 50 MG/1
TAKE 1 TABLET (50 MG) BY ORAL ROUTE ONCE DAILY TABLET, EXTENDED RELEASE ORAL 1
Qty: 0 | Refills: 0 | Status: ACTIVE | COMMUNITY
Start: 1900-01-01

## 2023-12-29 RX ORDER — FAMOTIDINE 40 MG/1
1 TABLET AS NEEDED TABLET, FILM COATED ORAL TWICE A DAY
Qty: 180 TABLET | Refills: 3 | Status: ACTIVE | COMMUNITY
Start: 2021-11-18

## 2023-12-29 RX ORDER — MESALAMINE 800 MG/1
2 TABLETS TABLET, DELAYED RELEASE ORAL TWICE A DAY
Qty: 360 TABLET | Refills: 3 | Status: ACTIVE | COMMUNITY
Start: 2023-10-31 | End: 2024-10-25

## 2023-12-29 RX ORDER — MESALAMINE 800 MG/1
3 TABLETS TABLET, DELAYED RELEASE ORAL
Qty: 540 TABLET | Refills: 3 | Status: ACTIVE | COMMUNITY
Start: 2023-05-30 | End: 2024-05-24

## 2023-12-29 RX ORDER — LEVOTHYROXINE SODIUM 0.05 MG/1
TABLET ORAL
Qty: 0 | Refills: 0 | Status: ACTIVE | COMMUNITY
Start: 1900-01-01

## 2023-12-29 RX ORDER — FAMOTIDINE 40 MG/1
1 TABLET TABLET, FILM COATED ORAL TWICE A DAY
Qty: 180 TABLET | Refills: 3 | Status: ACTIVE | COMMUNITY
Start: 2023-05-30

## 2023-12-29 RX ORDER — FAMOTIDINE 40 MG/1
1 TABLET AS NEEDED TABLET, FILM COATED ORAL TWICE A DAY
Qty: 180 TABLET | Refills: 3 | Status: ACTIVE | COMMUNITY
Start: 2022-10-26

## 2023-12-29 RX ORDER — COLESEVELAM HYDROCHLORIDE 625 MG/1
3 TABLETS WITH MEALS TABLET, COATED ORAL TWICE A DAY
Qty: 540 TABLET | Refills: 3 | Status: ACTIVE | COMMUNITY
Start: 2023-05-30

## 2023-12-29 RX ORDER — FAMOTIDINE 40 MG/1
1 TABLET AS NEEDED TABLET, FILM COATED ORAL TWICE A DAY
Qty: 180 TABLET | Refills: 3 | Status: ACTIVE | COMMUNITY
Start: 2020-08-07

## 2023-12-29 RX ORDER — ATORVASTATIN CALCIUM 40 MG/1
1 TABLET TABLET, FILM COATED ORAL ONCE A DAY
Status: ACTIVE | COMMUNITY

## 2023-12-29 RX ORDER — INFLIXIMAB 100 MG/10ML
AS DIRECTED INJECTION, POWDER, LYOPHILIZED, FOR SOLUTION INTRAVENOUS
Status: ACTIVE | COMMUNITY
Start: 2023-12-01

## 2023-12-29 RX ORDER — RIZATRIPTAN BENZOATE 10 MG
PRN MIGRAINES TABLET ORAL
Qty: 0 | Refills: 0 | Status: ACTIVE | COMMUNITY
Start: 1900-01-01

## 2023-12-29 RX ORDER — MESALAMINE 800 MG/1
3 TABLETS TABLET, DELAYED RELEASE ORAL TWICE A DAY
Qty: 540 TABLET | Refills: 3 | Status: ACTIVE | COMMUNITY
Start: 2023-10-31 | End: 2024-10-25

## 2023-12-29 RX ORDER — COLESEVELAM HYDROCHLORIDE 625 MG/1
3 TABLETS TABLET, FILM COATED ORAL QHS
Qty: 270 | Refills: 3 | Status: ACTIVE | COMMUNITY
Start: 2021-11-18

## 2023-12-31 LAB
MITOGEN-NIL: >10
QUANTIFERON NIL VALUE: 0.05
QUANTIFERON TB1 AG VALUE: <0
QUANTIFERON TB2 AG VALUE: 0
QUANTIFERON-TB GOLD PLUS: NEGATIVE

## 2024-01-09 ENCOUNTER — WEB ENCOUNTER (OUTPATIENT)
Dept: URBAN - METROPOLITAN AREA CLINIC 96 | Facility: CLINIC | Age: 64
End: 2024-01-09

## 2024-01-09 RX ORDER — MESALAMINE 400 MG/1
6 CAPSULES CAPSULE, DELAYED RELEASE ORAL
Qty: 1080 CAPSULE | Refills: 2 | OUTPATIENT
Start: 2024-01-11 | End: 2024-10-07

## 2024-01-10 ENCOUNTER — TELEPHONE ENCOUNTER (OUTPATIENT)
Dept: URBAN - METROPOLITAN AREA CLINIC 96 | Facility: CLINIC | Age: 64
End: 2024-01-10

## 2024-01-11 ENCOUNTER — WEB ENCOUNTER (OUTPATIENT)
Dept: URBAN - METROPOLITAN AREA CLINIC 96 | Facility: CLINIC | Age: 64
End: 2024-01-11

## 2024-01-12 ENCOUNTER — WEB ENCOUNTER (OUTPATIENT)
Dept: URBAN - METROPOLITAN AREA CLINIC 96 | Facility: CLINIC | Age: 64
End: 2024-01-12

## 2024-02-06 ENCOUNTER — REM (OUTPATIENT)
Dept: URBAN - METROPOLITAN AREA CLINIC 79 | Facility: CLINIC | Age: 64
End: 2024-02-06
Payer: COMMERCIAL

## 2024-02-06 VITALS
HEIGHT: 60 IN | RESPIRATION RATE: 20 BRPM | DIASTOLIC BLOOD PRESSURE: 74 MMHG | BODY MASS INDEX: 36.52 KG/M2 | TEMPERATURE: 97.7 F | SYSTOLIC BLOOD PRESSURE: 118 MMHG | HEART RATE: 88 BPM | WEIGHT: 186 LBS

## 2024-02-06 DIAGNOSIS — K50.80 CROHN'S COLITIS: ICD-10-CM

## 2024-02-06 PROCEDURE — 96415 CHEMO IV INFUSION ADDL HR: CPT | Performed by: INTERNAL MEDICINE

## 2024-02-06 PROCEDURE — 96413 CHEMO IV INFUSION 1 HR: CPT | Performed by: INTERNAL MEDICINE

## 2024-02-06 RX ORDER — AMITRIPTYLINE HYDROCHLORIDE 25 MG/1
TAKE 1 TABLET (25 MG) BY ORAL ROUTE ONCE DAILY AT BEDTIME TABLET, FILM COATED ORAL 1
Qty: 0 | Refills: 0 | Status: ACTIVE | COMMUNITY
Start: 1900-01-01

## 2024-02-06 RX ORDER — COLESEVELAM HYDROCHLORIDE 625 MG/1
3 TABLETS AT BEDTIME TABLET, FILM COATED ORAL ONCE A DAY
Qty: 270 TABLET | Refills: 3 | Status: ACTIVE | COMMUNITY
Start: 2022-11-13

## 2024-02-06 RX ORDER — COLESEVELAM HYDROCHLORIDE 625 MG/1
3 TABLETS WITH MEALS TABLET, COATED ORAL TWICE A DAY
Qty: 540 TABLET | Refills: 3 | Status: ACTIVE | COMMUNITY
Start: 2023-05-30

## 2024-02-06 RX ORDER — FAMOTIDINE 40 MG/1
1 TABLET AS NEEDED TABLET, FILM COATED ORAL TWICE A DAY
Qty: 180 TABLET | Refills: 3 | Status: ACTIVE | COMMUNITY
Start: 2020-08-07

## 2024-02-06 RX ORDER — FAMOTIDINE 40 MG/1
1 TABLET TABLET, FILM COATED ORAL TWICE A DAY
Qty: 180 TABLET | Refills: 3 | Status: ACTIVE | COMMUNITY
Start: 2023-05-30

## 2024-02-06 RX ORDER — RIZATRIPTAN BENZOATE 10 MG
PRN MIGRAINES TABLET ORAL
Qty: 0 | Refills: 0 | Status: ACTIVE | COMMUNITY
Start: 1900-01-01

## 2024-02-06 RX ORDER — MESALAMINE 800 MG/1
3 TABLETS TABLET, DELAYED RELEASE ORAL TWICE A DAY
Qty: 540 TABLET | Refills: 3 | Status: ACTIVE | COMMUNITY
Start: 2023-10-31 | End: 2024-10-25

## 2024-02-06 RX ORDER — MESALAMINE 400 MG/1
6 CAPSULES CAPSULE, DELAYED RELEASE ORAL
Qty: 1080 CAPSULE | Refills: 2 | Status: ACTIVE | COMMUNITY
Start: 2024-01-11 | End: 2024-10-07

## 2024-02-06 RX ORDER — INFLIXIMAB 100 MG/10ML
AS DIRECTED INJECTION, POWDER, LYOPHILIZED, FOR SOLUTION INTRAVENOUS
Status: ACTIVE | COMMUNITY
Start: 2023-12-01

## 2024-02-06 RX ORDER — MESALAMINE 800 MG/1
3 TABLETS TABLET, DELAYED RELEASE ORAL
Qty: 540 TABLET | Refills: 3 | Status: ACTIVE | COMMUNITY
Start: 2023-05-30 | End: 2024-05-24

## 2024-02-06 RX ORDER — FAMOTIDINE 40 MG/1
1 TABLET AS NEEDED TABLET, FILM COATED ORAL TWICE A DAY
Qty: 180 TABLET | Refills: 3 | Status: ACTIVE | COMMUNITY
Start: 2021-11-18

## 2024-02-06 RX ORDER — LEVOTHYROXINE SODIUM 0.05 MG/1
TABLET ORAL
Qty: 0 | Refills: 0 | Status: ACTIVE | COMMUNITY
Start: 1900-01-01

## 2024-02-06 RX ORDER — ATORVASTATIN CALCIUM 40 MG/1
1 TABLET TABLET, FILM COATED ORAL ONCE A DAY
Status: ACTIVE | COMMUNITY

## 2024-02-06 RX ORDER — MESALAMINE 800 MG/1
2 TABLETS TABLET, DELAYED RELEASE ORAL TWICE A DAY
Qty: 360 TABLET | Refills: 3 | Status: ACTIVE | COMMUNITY
Start: 2023-10-31 | End: 2024-10-25

## 2024-02-06 RX ORDER — FAMOTIDINE 40 MG/1
1 TABLET AS NEEDED TABLET, FILM COATED ORAL TWICE A DAY
Qty: 180 TABLET | Refills: 3 | Status: ACTIVE | COMMUNITY
Start: 2022-10-26

## 2024-02-06 RX ORDER — METOPROLOL SUCCINATE 50 MG/1
TAKE 1 TABLET (50 MG) BY ORAL ROUTE ONCE DAILY TABLET, EXTENDED RELEASE ORAL 1
Qty: 0 | Refills: 0 | Status: ACTIVE | COMMUNITY
Start: 1900-01-01

## 2024-02-06 RX ORDER — COLESEVELAM HYDROCHLORIDE 625 MG/1
3 TABLETS TABLET, FILM COATED ORAL QHS
Qty: 270 | Refills: 3 | Status: ACTIVE | COMMUNITY
Start: 2021-11-18

## 2024-03-19 ENCOUNTER — REM (OUTPATIENT)
Dept: URBAN - METROPOLITAN AREA CLINIC 79 | Facility: CLINIC | Age: 64
End: 2024-03-19

## 2024-03-21 ENCOUNTER — REM (OUTPATIENT)
Dept: URBAN - METROPOLITAN AREA CLINIC 18 | Facility: CLINIC | Age: 64
End: 2024-03-21
Payer: COMMERCIAL

## 2024-03-21 VITALS
HEIGHT: 60 IN | WEIGHT: 184 LBS | TEMPERATURE: 98 F | SYSTOLIC BLOOD PRESSURE: 141 MMHG | BODY MASS INDEX: 36.12 KG/M2 | RESPIRATION RATE: 20 BRPM | HEART RATE: 84 BPM | DIASTOLIC BLOOD PRESSURE: 98 MMHG

## 2024-03-21 DIAGNOSIS — K50.80 CROHN'S COLITIS: ICD-10-CM

## 2024-03-21 PROCEDURE — 96413 CHEMO IV INFUSION 1 HR: CPT | Performed by: STUDENT IN AN ORGANIZED HEALTH CARE EDUCATION/TRAINING PROGRAM

## 2024-03-21 PROCEDURE — 96415 CHEMO IV INFUSION ADDL HR: CPT | Performed by: STUDENT IN AN ORGANIZED HEALTH CARE EDUCATION/TRAINING PROGRAM

## 2024-03-21 RX ORDER — COLESEVELAM HYDROCHLORIDE 625 MG/1
3 TABLETS WITH MEALS TABLET, COATED ORAL TWICE A DAY
Qty: 540 TABLET | Refills: 3 | Status: ACTIVE | COMMUNITY
Start: 2023-05-30

## 2024-03-21 RX ORDER — MESALAMINE 800 MG/1
3 TABLETS TABLET, DELAYED RELEASE ORAL TWICE A DAY
Qty: 540 TABLET | Refills: 3 | Status: ACTIVE | COMMUNITY
Start: 2023-10-31 | End: 2024-10-25

## 2024-03-21 RX ORDER — FAMOTIDINE 40 MG/1
1 TABLET AS NEEDED TABLET, FILM COATED ORAL TWICE A DAY
Qty: 180 TABLET | Refills: 3 | Status: ACTIVE | COMMUNITY
Start: 2020-08-07

## 2024-03-21 RX ORDER — FAMOTIDINE 40 MG/1
1 TABLET TABLET, FILM COATED ORAL TWICE A DAY
Qty: 180 TABLET | Refills: 3 | Status: ACTIVE | COMMUNITY
Start: 2024-03-13

## 2024-03-21 RX ORDER — INFLIXIMAB 100 MG/10ML
AS DIRECTED INJECTION, POWDER, LYOPHILIZED, FOR SOLUTION INTRAVENOUS
Status: ACTIVE | COMMUNITY
Start: 2023-12-01

## 2024-03-21 RX ORDER — MESALAMINE 400 MG/1
6 CAPSULES CAPSULE, DELAYED RELEASE ORAL
Qty: 1080 CAPSULE | Refills: 2 | Status: ACTIVE | COMMUNITY
Start: 2024-01-11 | End: 2024-10-07

## 2024-03-21 RX ORDER — FAMOTIDINE 40 MG/1
1 TABLET AS NEEDED TABLET, FILM COATED ORAL TWICE A DAY
Qty: 180 TABLET | Refills: 3 | Status: ACTIVE | COMMUNITY
Start: 2021-11-18

## 2024-03-21 RX ORDER — COLESEVELAM HYDROCHLORIDE 625 MG/1
3 TABLETS AT BEDTIME TABLET, FILM COATED ORAL ONCE A DAY
Qty: 270 TABLET | Refills: 3 | Status: ACTIVE | COMMUNITY
Start: 2022-11-13

## 2024-03-21 RX ORDER — RIZATRIPTAN BENZOATE 10 MG
PRN MIGRAINES TABLET ORAL
Qty: 0 | Refills: 0 | Status: ACTIVE | COMMUNITY
Start: 1900-01-01

## 2024-03-21 RX ORDER — ATORVASTATIN CALCIUM 40 MG/1
1 TABLET TABLET, FILM COATED ORAL ONCE A DAY
Status: ACTIVE | COMMUNITY

## 2024-03-21 RX ORDER — FAMOTIDINE 40 MG/1
1 TABLET AS NEEDED TABLET, FILM COATED ORAL TWICE A DAY
Qty: 180 TABLET | Refills: 3 | Status: ACTIVE | COMMUNITY
Start: 2022-10-26

## 2024-03-21 RX ORDER — LEVOTHYROXINE SODIUM 0.05 MG/1
TABLET ORAL
Qty: 0 | Refills: 0 | Status: ACTIVE | COMMUNITY
Start: 1900-01-01

## 2024-03-21 RX ORDER — METOPROLOL SUCCINATE 50 MG/1
TAKE 1 TABLET (50 MG) BY ORAL ROUTE ONCE DAILY TABLET, EXTENDED RELEASE ORAL 1
Qty: 0 | Refills: 0 | Status: ACTIVE | COMMUNITY
Start: 1900-01-01

## 2024-03-21 RX ORDER — MESALAMINE 800 MG/1
3 TABLETS TABLET, DELAYED RELEASE ORAL
Qty: 540 TABLET | Refills: 3 | Status: ACTIVE | COMMUNITY
Start: 2023-05-30 | End: 2024-05-24

## 2024-03-21 RX ORDER — MESALAMINE 800 MG/1
2 TABLETS TABLET, DELAYED RELEASE ORAL TWICE A DAY
Qty: 360 TABLET | Refills: 3 | Status: ACTIVE | COMMUNITY
Start: 2023-10-31 | End: 2024-10-25

## 2024-03-21 RX ORDER — COLESEVELAM HYDROCHLORIDE 625 MG/1
3 TABLETS TABLET, FILM COATED ORAL QHS
Qty: 270 | Refills: 3 | Status: ACTIVE | COMMUNITY
Start: 2021-11-18

## 2024-03-21 RX ORDER — AMITRIPTYLINE HYDROCHLORIDE 25 MG/1
TAKE 1 TABLET (25 MG) BY ORAL ROUTE ONCE DAILY AT BEDTIME TABLET, FILM COATED ORAL 1
Qty: 0 | Refills: 0 | Status: ACTIVE | COMMUNITY
Start: 1900-01-01

## 2024-03-21 RX ORDER — FAMOTIDINE 40 MG/1
1 TABLET TABLET, FILM COATED ORAL TWICE A DAY
Qty: 180 TABLET | Refills: 3 | Status: ACTIVE | COMMUNITY
Start: 2023-05-30

## 2024-04-30 ENCOUNTER — REM (OUTPATIENT)
Dept: URBAN - METROPOLITAN AREA CLINIC 79 | Facility: CLINIC | Age: 64
End: 2024-04-30

## 2024-04-30 ENCOUNTER — REM (OUTPATIENT)
Dept: URBAN - METROPOLITAN AREA CLINIC 18 | Facility: CLINIC | Age: 64
End: 2024-04-30
Payer: COMMERCIAL

## 2024-04-30 VITALS
BODY MASS INDEX: 37.11 KG/M2 | HEART RATE: 85 BPM | TEMPERATURE: 97.5 F | WEIGHT: 189 LBS | SYSTOLIC BLOOD PRESSURE: 153 MMHG | HEIGHT: 60 IN | RESPIRATION RATE: 18 BRPM | DIASTOLIC BLOOD PRESSURE: 96 MMHG

## 2024-04-30 DIAGNOSIS — K50.80 CROHN'S COLITIS: ICD-10-CM

## 2024-04-30 PROCEDURE — 96415 CHEMO IV INFUSION ADDL HR: CPT | Performed by: INTERNAL MEDICINE

## 2024-04-30 PROCEDURE — 96413 CHEMO IV INFUSION 1 HR: CPT | Performed by: INTERNAL MEDICINE

## 2024-04-30 RX ORDER — COLESEVELAM HYDROCHLORIDE 625 MG/1
3 TABLETS WITH MEALS TABLET, COATED ORAL TWICE A DAY
Qty: 540 TABLET | Refills: 3 | Status: ACTIVE | COMMUNITY
Start: 2023-05-30

## 2024-04-30 RX ORDER — COLESEVELAM HYDROCHLORIDE 625 MG/1
3 TABLETS AT BEDTIME TABLET, FILM COATED ORAL ONCE A DAY
Qty: 270 TABLET | Refills: 3 | Status: ACTIVE | COMMUNITY
Start: 2022-11-13

## 2024-04-30 RX ORDER — AMITRIPTYLINE HYDROCHLORIDE 25 MG/1
TAKE 1 TABLET (25 MG) BY ORAL ROUTE ONCE DAILY AT BEDTIME TABLET, FILM COATED ORAL 1
Qty: 0 | Refills: 0 | Status: ACTIVE | COMMUNITY
Start: 1900-01-01

## 2024-04-30 RX ORDER — MESALAMINE 800 MG/1
2 TABLETS TABLET, DELAYED RELEASE ORAL TWICE A DAY
Qty: 360 TABLET | Refills: 3 | Status: ACTIVE | COMMUNITY
Start: 2023-10-31 | End: 2024-10-25

## 2024-04-30 RX ORDER — FAMOTIDINE 40 MG/1
1 TABLET AS NEEDED TABLET, FILM COATED ORAL TWICE A DAY
Qty: 180 TABLET | Refills: 3 | Status: ACTIVE | COMMUNITY
Start: 2021-11-18

## 2024-04-30 RX ORDER — FAMOTIDINE 40 MG/1
1 TABLET TABLET, FILM COATED ORAL TWICE A DAY
Qty: 180 TABLET | Refills: 3 | Status: ACTIVE | COMMUNITY
Start: 2023-05-30

## 2024-04-30 RX ORDER — INFLIXIMAB 100 MG/10ML
AS DIRECTED INJECTION, POWDER, LYOPHILIZED, FOR SOLUTION INTRAVENOUS
Status: ACTIVE | COMMUNITY
Start: 2023-12-01

## 2024-04-30 RX ORDER — METOPROLOL SUCCINATE 50 MG/1
TAKE 1 TABLET (50 MG) BY ORAL ROUTE ONCE DAILY TABLET, EXTENDED RELEASE ORAL 1
Qty: 0 | Refills: 0 | Status: ACTIVE | COMMUNITY
Start: 1900-01-01

## 2024-04-30 RX ORDER — FAMOTIDINE 40 MG/1
1 TABLET AS NEEDED TABLET, FILM COATED ORAL TWICE A DAY
Qty: 180 TABLET | Refills: 3 | Status: ACTIVE | COMMUNITY
Start: 2022-10-26

## 2024-04-30 RX ORDER — ATORVASTATIN CALCIUM 40 MG/1
1 TABLET TABLET, FILM COATED ORAL ONCE A DAY
Status: ACTIVE | COMMUNITY

## 2024-04-30 RX ORDER — LEVOTHYROXINE SODIUM 0.05 MG/1
TABLET ORAL
Qty: 0 | Refills: 0 | Status: ACTIVE | COMMUNITY
Start: 1900-01-01

## 2024-04-30 RX ORDER — FAMOTIDINE 40 MG/1
1 TABLET TABLET, FILM COATED ORAL TWICE A DAY
Qty: 180 TABLET | Refills: 3 | Status: ACTIVE | COMMUNITY
Start: 2024-03-13

## 2024-04-30 RX ORDER — COLESEVELAM HYDROCHLORIDE 625 MG/1
3 TABLETS TABLET, FILM COATED ORAL QHS
Qty: 270 | Refills: 3 | Status: ACTIVE | COMMUNITY
Start: 2021-11-18

## 2024-04-30 RX ORDER — MESALAMINE 400 MG/1
6 CAPSULES CAPSULE, DELAYED RELEASE ORAL
Qty: 1080 CAPSULE | Refills: 2 | Status: ACTIVE | COMMUNITY
Start: 2024-01-11 | End: 2024-10-07

## 2024-04-30 RX ORDER — MESALAMINE 800 MG/1
3 TABLETS TABLET, DELAYED RELEASE ORAL
Qty: 540 TABLET | Refills: 3 | Status: ACTIVE | COMMUNITY
Start: 2023-05-30 | End: 2024-05-24

## 2024-04-30 RX ORDER — MESALAMINE 800 MG/1
3 TABLETS TABLET, DELAYED RELEASE ORAL TWICE A DAY
Qty: 540 TABLET | Refills: 3 | Status: ACTIVE | COMMUNITY
Start: 2023-10-31 | End: 2024-10-25

## 2024-04-30 RX ORDER — RIZATRIPTAN BENZOATE 10 MG
PRN MIGRAINES TABLET ORAL
Qty: 0 | Refills: 0 | Status: ACTIVE | COMMUNITY
Start: 1900-01-01

## 2024-04-30 RX ORDER — FAMOTIDINE 40 MG/1
1 TABLET AS NEEDED TABLET, FILM COATED ORAL TWICE A DAY
Qty: 180 TABLET | Refills: 3 | Status: ACTIVE | COMMUNITY
Start: 2020-08-07

## 2024-06-14 ENCOUNTER — OFFICE VISIT (OUTPATIENT)
Dept: URBAN - METROPOLITAN AREA CLINIC 79 | Facility: CLINIC | Age: 64
End: 2024-06-14
Payer: MEDICARE

## 2024-06-14 VITALS
WEIGHT: 184 LBS | BODY MASS INDEX: 36.12 KG/M2 | SYSTOLIC BLOOD PRESSURE: 114 MMHG | HEIGHT: 60 IN | DIASTOLIC BLOOD PRESSURE: 69 MMHG

## 2024-06-14 DIAGNOSIS — K50.80 CROHN'S COLITIS: ICD-10-CM

## 2024-06-14 PROCEDURE — 96415 CHEMO IV INFUSION ADDL HR: CPT | Performed by: INTERNAL MEDICINE

## 2024-06-14 PROCEDURE — 96413 CHEMO IV INFUSION 1 HR: CPT | Performed by: INTERNAL MEDICINE

## 2024-06-14 RX ORDER — ATORVASTATIN CALCIUM 40 MG/1
1 TABLET TABLET, FILM COATED ORAL ONCE A DAY
Status: ACTIVE | COMMUNITY

## 2024-06-14 RX ORDER — AMITRIPTYLINE HYDROCHLORIDE 25 MG/1
TAKE 1 TABLET (25 MG) BY ORAL ROUTE ONCE DAILY AT BEDTIME TABLET, FILM COATED ORAL 1
Qty: 0 | Refills: 0 | Status: ACTIVE | COMMUNITY
Start: 1900-01-01

## 2024-06-14 RX ORDER — FAMOTIDINE 40 MG/1
1 TABLET AS NEEDED TABLET, FILM COATED ORAL TWICE A DAY
Qty: 180 TABLET | Refills: 3 | Status: ACTIVE | COMMUNITY
Start: 2022-10-26

## 2024-06-14 RX ORDER — INFLIXIMAB 100 MG/10ML
AS DIRECTED INJECTION, POWDER, LYOPHILIZED, FOR SOLUTION INTRAVENOUS
Status: ACTIVE | COMMUNITY
Start: 2023-12-01

## 2024-06-14 RX ORDER — LEVOTHYROXINE SODIUM 0.05 MG/1
TABLET ORAL
Qty: 0 | Refills: 0 | Status: ACTIVE | COMMUNITY
Start: 1900-01-01

## 2024-06-14 RX ORDER — MESALAMINE 800 MG/1
3 TABLETS TABLET, DELAYED RELEASE ORAL TWICE A DAY
Qty: 540 TABLET | Refills: 3 | Status: ACTIVE | COMMUNITY
Start: 2023-10-31 | End: 2024-10-25

## 2024-06-14 RX ORDER — COLESEVELAM HYDROCHLORIDE 625 MG/1
3 TABLETS WITH MEALS TABLET, COATED ORAL TWICE A DAY
Qty: 540 TABLET | Refills: 3 | Status: ACTIVE | COMMUNITY
Start: 2023-05-30

## 2024-06-14 RX ORDER — COLESEVELAM HYDROCHLORIDE 625 MG/1
3 TABLETS AT BEDTIME TABLET, FILM COATED ORAL ONCE A DAY
Qty: 270 TABLET | Refills: 3 | Status: ACTIVE | COMMUNITY
Start: 2022-11-13

## 2024-06-14 RX ORDER — FAMOTIDINE 40 MG/1
1 TABLET TABLET, FILM COATED ORAL TWICE A DAY
Qty: 180 TABLET | Refills: 3 | Status: ACTIVE | COMMUNITY
Start: 2023-05-30

## 2024-06-14 RX ORDER — COLESEVELAM HYDROCHLORIDE 625 MG/1
3 TABLETS TABLET, FILM COATED ORAL QHS
Qty: 270 | Refills: 3 | Status: ACTIVE | COMMUNITY
Start: 2021-11-18

## 2024-06-14 RX ORDER — RIZATRIPTAN BENZOATE 10 MG
PRN MIGRAINES TABLET ORAL
Qty: 0 | Refills: 0 | Status: ACTIVE | COMMUNITY
Start: 1900-01-01

## 2024-06-14 RX ORDER — FAMOTIDINE 40 MG/1
1 TABLET AS NEEDED TABLET, FILM COATED ORAL TWICE A DAY
Qty: 180 TABLET | Refills: 3 | Status: ACTIVE | COMMUNITY
Start: 2020-08-07

## 2024-06-14 RX ORDER — FAMOTIDINE 40 MG/1
1 TABLET TABLET, FILM COATED ORAL TWICE A DAY
Qty: 180 TABLET | Refills: 3 | Status: ACTIVE | COMMUNITY
Start: 2024-03-13

## 2024-06-14 RX ORDER — FAMOTIDINE 40 MG/1
1 TABLET AS NEEDED TABLET, FILM COATED ORAL TWICE A DAY
Qty: 180 TABLET | Refills: 3 | Status: ACTIVE | COMMUNITY
Start: 2021-11-18

## 2024-06-14 RX ORDER — MESALAMINE 400 MG/1
6 CAPSULES CAPSULE, DELAYED RELEASE ORAL
Qty: 1080 CAPSULE | Refills: 2 | Status: ACTIVE | COMMUNITY
Start: 2024-01-11 | End: 2024-10-07

## 2024-06-14 RX ORDER — METOPROLOL SUCCINATE 50 MG/1
TAKE 1 TABLET (50 MG) BY ORAL ROUTE ONCE DAILY TABLET, EXTENDED RELEASE ORAL 1
Qty: 0 | Refills: 0 | Status: ACTIVE | COMMUNITY
Start: 1900-01-01

## 2024-06-14 RX ORDER — MESALAMINE 800 MG/1
2 TABLETS TABLET, DELAYED RELEASE ORAL TWICE A DAY
Qty: 360 TABLET | Refills: 3 | Status: ACTIVE | COMMUNITY
Start: 2023-10-31 | End: 2024-10-25

## 2024-06-15 ENCOUNTER — WEB ENCOUNTER (OUTPATIENT)
Dept: URBAN - METROPOLITAN AREA CLINIC 96 | Facility: CLINIC | Age: 64
End: 2024-06-15

## 2024-06-18 ENCOUNTER — WEB ENCOUNTER (OUTPATIENT)
Dept: URBAN - METROPOLITAN AREA CLINIC 96 | Facility: CLINIC | Age: 64
End: 2024-06-18

## 2024-06-18 RX ORDER — COLESEVELAM HYDROCHLORIDE 625 MG/1
3 TABLETS WITH MEALS TABLET, COATED ORAL TWICE A DAY
Qty: 540 TABLET | Refills: 3 | OUTPATIENT
Start: 2024-06-18

## 2024-07-15 ENCOUNTER — WEB ENCOUNTER (OUTPATIENT)
Dept: URBAN - METROPOLITAN AREA CLINIC 96 | Facility: CLINIC | Age: 64
End: 2024-07-15

## 2024-07-15 RX ORDER — MESALAMINE 800 MG/1
2 TABLETS TABLET, DELAYED RELEASE ORAL TWICE A DAY
Qty: 120 | Refills: 11 | OUTPATIENT
Start: 2024-07-20 | End: 2025-07-15

## 2024-07-22 ENCOUNTER — WEB ENCOUNTER (OUTPATIENT)
Dept: URBAN - METROPOLITAN AREA CLINIC 96 | Facility: CLINIC | Age: 64
End: 2024-07-22

## 2024-07-25 ENCOUNTER — OFFICE VISIT (OUTPATIENT)
Dept: URBAN - METROPOLITAN AREA CLINIC 18 | Facility: CLINIC | Age: 64
End: 2024-07-25
Payer: MEDICARE

## 2024-07-25 VITALS
DIASTOLIC BLOOD PRESSURE: 87 MMHG | HEIGHT: 60 IN | TEMPERATURE: 97.5 F | SYSTOLIC BLOOD PRESSURE: 146 MMHG | WEIGHT: 184 LBS | HEART RATE: 78 BPM | RESPIRATION RATE: 18 BRPM | BODY MASS INDEX: 36.12 KG/M2

## 2024-07-25 DIAGNOSIS — K50.80 CROHN'S COLITIS: ICD-10-CM

## 2024-07-25 PROCEDURE — 96415 CHEMO IV INFUSION ADDL HR: CPT | Performed by: STUDENT IN AN ORGANIZED HEALTH CARE EDUCATION/TRAINING PROGRAM

## 2024-07-25 PROCEDURE — 96413 CHEMO IV INFUSION 1 HR: CPT | Performed by: STUDENT IN AN ORGANIZED HEALTH CARE EDUCATION/TRAINING PROGRAM

## 2024-07-25 RX ORDER — FAMOTIDINE 40 MG/1
1 TABLET AS NEEDED TABLET, FILM COATED ORAL TWICE A DAY
Qty: 180 TABLET | Refills: 3 | Status: ACTIVE | COMMUNITY
Start: 2021-11-18

## 2024-07-25 RX ORDER — COLESEVELAM HYDROCHLORIDE 625 MG/1
3 TABLETS AT BEDTIME TABLET, FILM COATED ORAL ONCE A DAY
Qty: 270 TABLET | Refills: 3 | Status: ACTIVE | COMMUNITY
Start: 2022-11-13

## 2024-07-25 RX ORDER — INFLIXIMAB 100 MG/10ML
AS DIRECTED INJECTION, POWDER, LYOPHILIZED, FOR SOLUTION INTRAVENOUS
Status: ACTIVE | COMMUNITY
Start: 2023-12-01

## 2024-07-25 RX ORDER — MESALAMINE 800 MG/1
2 TABLETS TABLET, DELAYED RELEASE ORAL TWICE A DAY
Qty: 360 TABLET | Refills: 3 | Status: ACTIVE | COMMUNITY
Start: 2023-10-31 | End: 2024-10-25

## 2024-07-25 RX ORDER — ATORVASTATIN CALCIUM 40 MG/1
1 TABLET TABLET, FILM COATED ORAL ONCE A DAY
Status: ACTIVE | COMMUNITY

## 2024-07-25 RX ORDER — RIZATRIPTAN BENZOATE 10 MG
PRN MIGRAINES TABLET ORAL
Qty: 0 | Refills: 0 | Status: ACTIVE | COMMUNITY
Start: 1900-01-01

## 2024-07-25 RX ORDER — COLESEVELAM HYDROCHLORIDE 625 MG/1
3 TABLETS WITH MEALS TABLET, COATED ORAL TWICE A DAY
Qty: 540 TABLET | Refills: 3 | Status: ACTIVE | COMMUNITY
Start: 2024-06-18

## 2024-07-25 RX ORDER — FAMOTIDINE 40 MG/1
1 TABLET AS NEEDED TABLET, FILM COATED ORAL TWICE A DAY
Qty: 180 TABLET | Refills: 3 | Status: ACTIVE | COMMUNITY
Start: 2022-10-26

## 2024-07-25 RX ORDER — MESALAMINE 800 MG/1
3 TABLETS TABLET, DELAYED RELEASE ORAL TWICE A DAY
Qty: 540 TABLET | Refills: 3 | Status: ACTIVE | COMMUNITY
Start: 2023-10-31 | End: 2024-10-25

## 2024-07-25 RX ORDER — MESALAMINE 400 MG/1
6 CAPSULES CAPSULE, DELAYED RELEASE ORAL
Qty: 1080 CAPSULE | Refills: 2 | Status: ACTIVE | COMMUNITY
Start: 2024-01-11 | End: 2024-10-07

## 2024-07-25 RX ORDER — COLESEVELAM HYDROCHLORIDE 625 MG/1
3 TABLETS TABLET, FILM COATED ORAL QHS
Qty: 270 | Refills: 3 | Status: ACTIVE | COMMUNITY
Start: 2021-11-18

## 2024-07-25 RX ORDER — FAMOTIDINE 40 MG/1
1 TABLET TABLET, FILM COATED ORAL TWICE A DAY
Qty: 180 TABLET | Refills: 3 | Status: ACTIVE | COMMUNITY
Start: 2024-03-13

## 2024-07-25 RX ORDER — METOPROLOL SUCCINATE 50 MG/1
TAKE 1 TABLET (50 MG) BY ORAL ROUTE ONCE DAILY TABLET, EXTENDED RELEASE ORAL 1
Qty: 0 | Refills: 0 | Status: ACTIVE | COMMUNITY
Start: 1900-01-01

## 2024-07-25 RX ORDER — FAMOTIDINE 40 MG/1
1 TABLET TABLET, FILM COATED ORAL TWICE A DAY
Qty: 180 TABLET | Refills: 3 | Status: ACTIVE | COMMUNITY
Start: 2023-05-30

## 2024-07-25 RX ORDER — COLESEVELAM HYDROCHLORIDE 625 MG/1
3 TABLETS WITH MEALS TABLET, COATED ORAL TWICE A DAY
Qty: 540 TABLET | Refills: 3 | Status: ACTIVE | COMMUNITY
Start: 2023-05-30

## 2024-07-25 RX ORDER — AMITRIPTYLINE HYDROCHLORIDE 25 MG/1
TAKE 1 TABLET (25 MG) BY ORAL ROUTE ONCE DAILY AT BEDTIME TABLET, FILM COATED ORAL 1
Qty: 0 | Refills: 0 | Status: ACTIVE | COMMUNITY
Start: 1900-01-01

## 2024-07-25 RX ORDER — FAMOTIDINE 40 MG/1
1 TABLET AS NEEDED TABLET, FILM COATED ORAL TWICE A DAY
Qty: 180 TABLET | Refills: 3 | Status: ACTIVE | COMMUNITY
Start: 2020-08-07

## 2024-07-25 RX ORDER — MESALAMINE 800 MG/1
2 TABLETS TABLET, DELAYED RELEASE ORAL TWICE A DAY
Qty: 120 | Refills: 11 | Status: ACTIVE | COMMUNITY
Start: 2024-07-20 | End: 2025-07-15

## 2024-07-25 RX ORDER — LEVOTHYROXINE SODIUM 0.05 MG/1
TABLET ORAL
Qty: 0 | Refills: 0 | Status: ACTIVE | COMMUNITY
Start: 1900-01-01

## 2024-07-26 ENCOUNTER — OFFICE VISIT (OUTPATIENT)
Dept: URBAN - METROPOLITAN AREA CLINIC 79 | Facility: CLINIC | Age: 64
End: 2024-07-26

## 2024-08-14 ENCOUNTER — TELEPHONE ENCOUNTER (OUTPATIENT)
Dept: URBAN - METROPOLITAN AREA CLINIC 96 | Facility: CLINIC | Age: 64
End: 2024-08-14

## 2024-09-03 ENCOUNTER — OFFICE VISIT (OUTPATIENT)
Dept: URBAN - METROPOLITAN AREA CLINIC 96 | Facility: CLINIC | Age: 64
End: 2024-09-03
Payer: MEDICARE

## 2024-09-03 VITALS
TEMPERATURE: 98 F | SYSTOLIC BLOOD PRESSURE: 110 MMHG | BODY MASS INDEX: 36.52 KG/M2 | HEART RATE: 90 BPM | WEIGHT: 186 LBS | DIASTOLIC BLOOD PRESSURE: 82 MMHG | HEIGHT: 60 IN

## 2024-09-03 DIAGNOSIS — K50.80 CROHN'S COLITIS: ICD-10-CM

## 2024-09-03 DIAGNOSIS — M85.80 OSTEOPENIA: ICD-10-CM

## 2024-09-03 DIAGNOSIS — K90.89 BILE SALT-INDUCED DIARRHEA: ICD-10-CM

## 2024-09-03 DIAGNOSIS — K21.9 GERD (GASTROESOPHAGEAL REFLUX DISEASE): ICD-10-CM

## 2024-09-03 PROCEDURE — 99214 OFFICE O/P EST MOD 30 MIN: CPT | Performed by: INTERNAL MEDICINE

## 2024-09-03 RX ORDER — AMITRIPTYLINE HYDROCHLORIDE 25 MG/1
TAKE 1 TABLET (25 MG) BY ORAL ROUTE ONCE DAILY AT BEDTIME TABLET, FILM COATED ORAL 1
Qty: 0 | Refills: 0 | Status: ACTIVE | COMMUNITY
Start: 1900-01-01

## 2024-09-03 RX ORDER — RIZATRIPTAN BENZOATE 10 MG
PRN MIGRAINES TABLET ORAL
Qty: 0 | Refills: 0 | Status: ACTIVE | COMMUNITY
Start: 1900-01-01

## 2024-09-03 RX ORDER — LEVOTHYROXINE SODIUM 0.05 MG/1
TABLET ORAL
Qty: 0 | Refills: 0 | Status: ACTIVE | COMMUNITY
Start: 1900-01-01

## 2024-09-03 RX ORDER — METOPROLOL SUCCINATE 50 MG/1
TAKE 1 TABLET (50 MG) BY ORAL ROUTE ONCE DAILY TABLET, EXTENDED RELEASE ORAL 1
Qty: 0 | Refills: 0 | Status: ACTIVE | COMMUNITY
Start: 1900-01-01

## 2024-09-03 RX ORDER — FAMOTIDINE 40 MG/1
1 TABLET TABLET, FILM COATED ORAL TWICE A DAY
Qty: 180 TABLET | Refills: 3 | Status: ACTIVE | COMMUNITY
Start: 2024-03-13

## 2024-09-03 RX ORDER — COLESEVELAM HYDROCHLORIDE 625 MG/1
2 TABLETS WITH MEALS TABLET, COATED ORAL THREE TIMES A DAY
Qty: 540 TABLET | Refills: 3 | Status: ACTIVE | COMMUNITY
Start: 2024-06-18

## 2024-09-03 RX ORDER — ATORVASTATIN CALCIUM 40 MG/1
1 TABLET TABLET, FILM COATED ORAL ONCE A DAY
Status: ACTIVE | COMMUNITY

## 2024-09-03 RX ORDER — MESALAMINE 400 MG/1
6 CAPSULES CAPSULE, DELAYED RELEASE ORAL
Qty: 1080 CAPSULE | Refills: 2 | Status: ACTIVE | COMMUNITY
Start: 2024-01-11 | End: 2024-10-07

## 2024-09-03 RX ORDER — INFLIXIMAB 100 MG/10ML
AS DIRECTED INJECTION, POWDER, LYOPHILIZED, FOR SOLUTION INTRAVENOUS
Status: ACTIVE | COMMUNITY
Start: 2023-12-01

## 2024-09-03 NOTE — HPI-TODAY'S VISIT:
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 2 daughters. 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 just had a baby. second at [facility] in [place]. last ov 5/25/2023 on remicade and asacol. bms were still erratic. ams were worse...then somewhat better. but. still had 2-3 formed bms in the am. after lunch, she stayed "close to the restroom for an hour and a half" the evening is "perfect". now on welchol. on 2 tid and better. gerd still doing well on pepcid 40mg bid sees her other MD q 6mo and has Qufenqi.

## 2024-09-05 ENCOUNTER — OFFICE VISIT (OUTPATIENT)
Dept: URBAN - METROPOLITAN AREA CLINIC 18 | Facility: CLINIC | Age: 64
End: 2024-09-05
Payer: MEDICARE

## 2024-09-05 VITALS
BODY MASS INDEX: 36.52 KG/M2 | DIASTOLIC BLOOD PRESSURE: 81 MMHG | SYSTOLIC BLOOD PRESSURE: 143 MMHG | HEART RATE: 85 BPM | TEMPERATURE: 98.1 F | HEIGHT: 60 IN | WEIGHT: 186 LBS | RESPIRATION RATE: 18 BRPM

## 2024-09-05 DIAGNOSIS — K50.80 CROHN'S COLITIS: ICD-10-CM

## 2024-09-05 PROCEDURE — 96413 CHEMO IV INFUSION 1 HR: CPT | Performed by: STUDENT IN AN ORGANIZED HEALTH CARE EDUCATION/TRAINING PROGRAM

## 2024-09-05 PROCEDURE — 96415 CHEMO IV INFUSION ADDL HR: CPT | Performed by: STUDENT IN AN ORGANIZED HEALTH CARE EDUCATION/TRAINING PROGRAM

## 2024-09-05 RX ORDER — COLESEVELAM HYDROCHLORIDE 625 MG/1
6 TABLETS WITH MEALS TABLET, COATED ORAL TWICE A DAY
Qty: 1080 TABLET | Refills: 3 | Status: ACTIVE | COMMUNITY
Start: 2024-06-18

## 2024-09-05 RX ORDER — AMITRIPTYLINE HYDROCHLORIDE 25 MG/1
TAKE 1 TABLET (25 MG) BY ORAL ROUTE ONCE DAILY AT BEDTIME TABLET, FILM COATED ORAL 1
Qty: 0 | Refills: 0 | Status: ACTIVE | COMMUNITY
Start: 1900-01-01

## 2024-09-05 RX ORDER — LEVOTHYROXINE SODIUM 0.05 MG/1
TABLET ORAL
Qty: 0 | Refills: 0 | Status: ACTIVE | COMMUNITY
Start: 1900-01-01

## 2024-09-05 RX ORDER — ATORVASTATIN CALCIUM 40 MG/1
1 TABLET TABLET, FILM COATED ORAL ONCE A DAY
Status: ACTIVE | COMMUNITY

## 2024-09-05 RX ORDER — RIZATRIPTAN BENZOATE 10 MG
PRN MIGRAINES TABLET ORAL
Qty: 0 | Refills: 0 | Status: ACTIVE | COMMUNITY
Start: 1900-01-01

## 2024-09-05 RX ORDER — FAMOTIDINE 40 MG/1
1 TABLET TABLET, FILM COATED ORAL TWICE A DAY
Qty: 180 TABLET | Refills: 3 | Status: ACTIVE | COMMUNITY
Start: 2024-03-13

## 2024-09-05 RX ORDER — MESALAMINE 800 MG/1
2 TABLETS TABLET, DELAYED RELEASE ORAL TWICE A DAY
Qty: 120 | Refills: 11 | Status: ACTIVE | COMMUNITY
Start: 2024-07-20 | End: 2025-07-15

## 2024-09-05 RX ORDER — MESALAMINE 800 MG/1
2 TABLETS TABLET, DELAYED RELEASE ORAL TWICE A DAY
Qty: 360 TABLET | Refills: 3 | Status: ACTIVE | COMMUNITY
Start: 2023-10-31 | End: 2024-10-25

## 2024-09-05 RX ORDER — METOPROLOL SUCCINATE 50 MG/1
TAKE 1 TABLET (50 MG) BY ORAL ROUTE ONCE DAILY TABLET, EXTENDED RELEASE ORAL 1
Qty: 0 | Refills: 0 | Status: ACTIVE | COMMUNITY
Start: 1900-01-01

## 2024-09-05 RX ORDER — INFLIXIMAB 100 MG/10ML
AS DIRECTED INJECTION, POWDER, LYOPHILIZED, FOR SOLUTION INTRAVENOUS
Status: ACTIVE | COMMUNITY
Start: 2023-12-01

## 2024-09-05 RX ORDER — MESALAMINE 400 MG/1
6 CAPSULES CAPSULE, DELAYED RELEASE ORAL
Qty: 1080 CAPSULE | Refills: 2 | Status: ACTIVE | COMMUNITY
Start: 2024-01-11 | End: 2024-10-07

## 2024-09-06 ENCOUNTER — OFFICE VISIT (OUTPATIENT)
Dept: URBAN - METROPOLITAN AREA CLINIC 79 | Facility: CLINIC | Age: 64
End: 2024-09-06

## 2024-09-14 ENCOUNTER — TELEPHONE ENCOUNTER (OUTPATIENT)
Dept: URBAN - METROPOLITAN AREA CLINIC 96 | Facility: CLINIC | Age: 64
End: 2024-09-14

## 2024-09-14 RX ORDER — FAMOTIDINE 40 MG/1
1 TABLET TABLET, FILM COATED ORAL TWICE A DAY
Qty: 180 TABLET | Refills: 3 | OUTPATIENT
Start: 2024-09-14

## 2024-09-14 RX ORDER — COLESEVELAM HYDROCHLORIDE 625 MG/1
3 TABLETS WITH MEALS TABLET, COATED ORAL TWICE A DAY
Qty: 540 TABLET | Refills: 3 | OUTPATIENT
Start: 2024-09-14

## 2024-10-14 ENCOUNTER — OFFICE VISIT (OUTPATIENT)
Dept: URBAN - METROPOLITAN AREA CLINIC 18 | Facility: CLINIC | Age: 64
End: 2024-10-14
Payer: MEDICARE

## 2024-10-14 VITALS
SYSTOLIC BLOOD PRESSURE: 148 MMHG | DIASTOLIC BLOOD PRESSURE: 83 MMHG | TEMPERATURE: 97.6 F | RESPIRATION RATE: 20 BRPM | BODY MASS INDEX: 36.12 KG/M2 | WEIGHT: 184 LBS | HEIGHT: 60 IN | HEART RATE: 78 BPM

## 2024-10-14 DIAGNOSIS — K50.80 CROHN'S COLITIS: ICD-10-CM

## 2024-10-14 PROCEDURE — 96415 CHEMO IV INFUSION ADDL HR: CPT | Performed by: INTERNAL MEDICINE

## 2024-10-14 PROCEDURE — 96413 CHEMO IV INFUSION 1 HR: CPT | Performed by: INTERNAL MEDICINE

## 2024-10-14 RX ORDER — ATORVASTATIN CALCIUM 40 MG/1
1 TABLET TABLET, FILM COATED ORAL ONCE A DAY
Status: ACTIVE | COMMUNITY

## 2024-10-14 RX ORDER — RIZATRIPTAN BENZOATE 10 MG
PRN MIGRAINES TABLET ORAL
Qty: 0 | Refills: 0 | Status: ACTIVE | COMMUNITY
Start: 1900-01-01

## 2024-10-14 RX ORDER — AMITRIPTYLINE HYDROCHLORIDE 25 MG/1
TAKE 1 TABLET (25 MG) BY ORAL ROUTE ONCE DAILY AT BEDTIME TABLET, FILM COATED ORAL 1
Qty: 0 | Refills: 0 | Status: ACTIVE | COMMUNITY
Start: 1900-01-01

## 2024-10-14 RX ORDER — COLESEVELAM HYDROCHLORIDE 625 MG/1
6 TABLETS WITH MEALS TABLET, COATED ORAL TWICE A DAY
Qty: 1080 TABLET | Refills: 3 | Status: ACTIVE | COMMUNITY
Start: 2024-06-18

## 2024-10-14 RX ORDER — FAMOTIDINE 40 MG/1
1 TABLET TABLET, FILM COATED ORAL TWICE A DAY
Qty: 180 TABLET | Refills: 3 | Status: ACTIVE | COMMUNITY
Start: 2024-03-13

## 2024-10-14 RX ORDER — INFLIXIMAB 100 MG/10ML
AS DIRECTED INJECTION, POWDER, LYOPHILIZED, FOR SOLUTION INTRAVENOUS
Status: ACTIVE | COMMUNITY
Start: 2023-12-01

## 2024-10-14 RX ORDER — MESALAMINE 800 MG/1
2 TABLETS TABLET, DELAYED RELEASE ORAL TWICE A DAY
Qty: 360 TABLET | Refills: 3 | Status: ACTIVE | COMMUNITY
Start: 2023-10-31 | End: 2024-10-25

## 2024-10-14 RX ORDER — COLESEVELAM HYDROCHLORIDE 625 MG/1
3 TABLETS WITH MEALS TABLET, COATED ORAL TWICE A DAY
Qty: 540 TABLET | Refills: 3 | Status: ACTIVE | COMMUNITY
Start: 2024-09-14

## 2024-10-14 RX ORDER — LEVOTHYROXINE SODIUM 0.05 MG/1
TABLET ORAL
Qty: 0 | Refills: 0 | Status: ACTIVE | COMMUNITY
Start: 1900-01-01

## 2024-10-14 RX ORDER — MESALAMINE 800 MG/1
2 TABLETS TABLET, DELAYED RELEASE ORAL TWICE A DAY
Qty: 120 | Refills: 11 | Status: ACTIVE | COMMUNITY
Start: 2024-07-20 | End: 2025-07-15

## 2024-10-14 RX ORDER — METOPROLOL SUCCINATE 50 MG/1
TAKE 1 TABLET (50 MG) BY ORAL ROUTE ONCE DAILY TABLET, EXTENDED RELEASE ORAL 1
Qty: 0 | Refills: 0 | Status: ACTIVE | COMMUNITY
Start: 1900-01-01

## 2024-10-14 RX ORDER — FAMOTIDINE 40 MG/1
1 TABLET TABLET, FILM COATED ORAL TWICE A DAY
Qty: 180 TABLET | Refills: 3 | Status: ACTIVE | COMMUNITY
Start: 2024-09-14

## 2024-11-25 ENCOUNTER — TELEPHONE ENCOUNTER (OUTPATIENT)
Dept: URBAN - METROPOLITAN AREA CLINIC 18 | Facility: CLINIC | Age: 64
End: 2024-11-25

## 2024-11-25 PROBLEM — 34000006: Status: ACTIVE | Noted: 2024-11-25

## 2024-11-26 ENCOUNTER — OFFICE VISIT (OUTPATIENT)
Dept: URBAN - METROPOLITAN AREA CLINIC 18 | Facility: CLINIC | Age: 64
End: 2024-11-26
Payer: MEDICARE

## 2024-11-26 VITALS
BODY MASS INDEX: 36.52 KG/M2 | DIASTOLIC BLOOD PRESSURE: 80 MMHG | WEIGHT: 186 LBS | HEIGHT: 60 IN | HEART RATE: 88 BPM | TEMPERATURE: 97.8 F | SYSTOLIC BLOOD PRESSURE: 139 MMHG | RESPIRATION RATE: 18 BRPM

## 2024-11-26 DIAGNOSIS — K50.90 CROHNS DISEASE: ICD-10-CM

## 2024-11-26 PROCEDURE — 96413 CHEMO IV INFUSION 1 HR: CPT | Performed by: INTERNAL MEDICINE

## 2024-11-26 PROCEDURE — 96415 CHEMO IV INFUSION ADDL HR: CPT | Performed by: INTERNAL MEDICINE

## 2024-11-26 RX ORDER — FAMOTIDINE 40 MG/1
1 TABLET TABLET, FILM COATED ORAL TWICE A DAY
Qty: 180 TABLET | Refills: 3 | Status: ACTIVE | COMMUNITY
Start: 2024-09-14

## 2024-11-26 RX ORDER — METOPROLOL SUCCINATE 50 MG/1
TAKE 1 TABLET (50 MG) BY ORAL ROUTE ONCE DAILY TABLET, EXTENDED RELEASE ORAL 1
Qty: 0 | Refills: 0 | Status: ACTIVE | COMMUNITY
Start: 1900-01-01

## 2024-11-26 RX ORDER — INFLIXIMAB 100 MG/10ML
AS DIRECTED INJECTION, POWDER, LYOPHILIZED, FOR SOLUTION INTRAVENOUS
Status: ACTIVE | COMMUNITY
Start: 2023-12-01

## 2024-11-26 RX ORDER — RIZATRIPTAN BENZOATE 10 MG
PRN MIGRAINES TABLET ORAL
Qty: 0 | Refills: 0 | Status: ACTIVE | COMMUNITY
Start: 1900-01-01

## 2024-11-26 RX ORDER — ATORVASTATIN CALCIUM 40 MG/1
1 TABLET TABLET, FILM COATED ORAL ONCE A DAY
Status: ACTIVE | COMMUNITY

## 2024-11-26 RX ORDER — MESALAMINE 800 MG/1
2 TABLETS TABLET, DELAYED RELEASE ORAL TWICE A DAY
Qty: 120 | Refills: 11 | Status: ACTIVE | COMMUNITY
Start: 2024-07-20 | End: 2025-07-15

## 2024-11-26 RX ORDER — FAMOTIDINE 40 MG/1
1 TABLET TABLET, FILM COATED ORAL TWICE A DAY
Qty: 180 TABLET | Refills: 3 | Status: ACTIVE | COMMUNITY
Start: 2024-03-13

## 2024-11-26 RX ORDER — LEVOTHYROXINE SODIUM 0.05 MG/1
TABLET ORAL
Qty: 0 | Refills: 0 | Status: ACTIVE | COMMUNITY
Start: 1900-01-01

## 2024-11-26 RX ORDER — COLESEVELAM HYDROCHLORIDE 625 MG/1
6 TABLETS WITH MEALS TABLET, COATED ORAL TWICE A DAY
Qty: 1080 TABLET | Refills: 3 | Status: ACTIVE | COMMUNITY
Start: 2024-06-18

## 2024-11-26 RX ORDER — AMITRIPTYLINE HYDROCHLORIDE 25 MG/1
TAKE 1 TABLET (25 MG) BY ORAL ROUTE ONCE DAILY AT BEDTIME TABLET, FILM COATED ORAL 1
Qty: 0 | Refills: 0 | Status: ACTIVE | COMMUNITY
Start: 1900-01-01

## 2024-11-26 RX ORDER — COLESEVELAM HYDROCHLORIDE 625 MG/1
3 TABLETS WITH MEALS TABLET, COATED ORAL TWICE A DAY
Qty: 540 TABLET | Refills: 3 | Status: ACTIVE | COMMUNITY
Start: 2024-09-14

## 2024-12-16 ENCOUNTER — OFFICE VISIT (OUTPATIENT)
Dept: URBAN - METROPOLITAN AREA SURGERY CENTER 18 | Facility: SURGERY CENTER | Age: 64
End: 2024-12-16
Payer: MEDICARE

## 2024-12-16 DIAGNOSIS — Z09 ENCNTR FOR F/U EXAM AFT TRTMT FOR COND OTH THAN MALIG NEOPLM: ICD-10-CM

## 2024-12-16 DIAGNOSIS — Z12.11 ENCOUNTER FOR SCREENING FOR MALIGNANT NEOPLASM OF COLON: ICD-10-CM

## 2024-12-16 DIAGNOSIS — K50.10 CROHN'S DISEASE OF LARGE INTESTINE WITHOUT COMPLICATIONS: ICD-10-CM

## 2024-12-16 DIAGNOSIS — K21.9 GERD: ICD-10-CM

## 2024-12-16 DIAGNOSIS — K50.80 CROHN'S COLITIS: ICD-10-CM

## 2024-12-16 PROCEDURE — 00811 ANES LWR INTST NDSC NOS: CPT | Performed by: CASE MANAGER/CARE COORDINATOR

## 2024-12-16 PROCEDURE — 00812 ANES LWR INTST SCR COLSC: CPT | Performed by: CASE MANAGER/CARE COORDINATOR

## 2024-12-16 PROCEDURE — G0105 COLORECTAL SCRN; HI RISK IND: HCPCS | Performed by: INTERNAL MEDICINE

## 2024-12-16 RX ORDER — AMITRIPTYLINE HYDROCHLORIDE 25 MG/1
TAKE 1 TABLET (25 MG) BY ORAL ROUTE ONCE DAILY AT BEDTIME TABLET, FILM COATED ORAL 1
Qty: 0 | Refills: 0 | Status: ACTIVE | COMMUNITY
Start: 1900-01-01

## 2024-12-16 RX ORDER — METOPROLOL SUCCINATE 50 MG/1
TAKE 1 TABLET (50 MG) BY ORAL ROUTE ONCE DAILY TABLET, EXTENDED RELEASE ORAL 1
Qty: 0 | Refills: 0 | Status: ACTIVE | COMMUNITY
Start: 1900-01-01

## 2024-12-16 RX ORDER — FAMOTIDINE 40 MG/1
1 TABLET TABLET, FILM COATED ORAL TWICE A DAY
Qty: 180 TABLET | Refills: 3 | Status: ACTIVE | COMMUNITY
Start: 2024-03-13

## 2024-12-16 RX ORDER — COLESEVELAM HYDROCHLORIDE 625 MG/1
3 TABLETS WITH MEALS TABLET, COATED ORAL TWICE A DAY
Qty: 540 TABLET | Refills: 3 | Status: ACTIVE | COMMUNITY
Start: 2024-09-14

## 2024-12-16 RX ORDER — LEVOTHYROXINE SODIUM 0.05 MG/1
TABLET ORAL
Qty: 0 | Refills: 0 | Status: ACTIVE | COMMUNITY
Start: 1900-01-01

## 2024-12-16 RX ORDER — ATORVASTATIN CALCIUM 40 MG/1
1 TABLET TABLET, FILM COATED ORAL ONCE A DAY
Status: ACTIVE | COMMUNITY

## 2024-12-16 RX ORDER — INFLIXIMAB 100 MG/10ML
AS DIRECTED INJECTION, POWDER, LYOPHILIZED, FOR SOLUTION INTRAVENOUS
Status: ACTIVE | COMMUNITY
Start: 2023-12-01

## 2024-12-16 RX ORDER — FAMOTIDINE 40 MG/1
1 TABLET TABLET, FILM COATED ORAL TWICE A DAY
Qty: 180 TABLET | Refills: 3 | Status: ACTIVE | COMMUNITY
Start: 2024-09-14

## 2024-12-16 RX ORDER — COLESEVELAM HYDROCHLORIDE 625 MG/1
6 TABLETS WITH MEALS TABLET, COATED ORAL TWICE A DAY
Qty: 1080 TABLET | Refills: 3 | Status: ACTIVE | COMMUNITY
Start: 2024-06-18

## 2024-12-16 RX ORDER — RIZATRIPTAN BENZOATE 10 MG
PRN MIGRAINES TABLET ORAL
Qty: 0 | Refills: 0 | Status: ACTIVE | COMMUNITY
Start: 1900-01-01

## 2024-12-16 RX ORDER — MESALAMINE 800 MG/1
2 TABLETS TABLET, DELAYED RELEASE ORAL TWICE A DAY
Qty: 120 | Refills: 11 | Status: ACTIVE | COMMUNITY
Start: 2024-07-20 | End: 2025-07-15

## 2025-01-06 ENCOUNTER — OFFICE VISIT (OUTPATIENT)
Dept: URBAN - METROPOLITAN AREA CLINIC 18 | Facility: CLINIC | Age: 65
End: 2025-01-06
Payer: MEDICARE

## 2025-01-06 VITALS
BODY MASS INDEX: 36.12 KG/M2 | HEIGHT: 60 IN | WEIGHT: 184 LBS | SYSTOLIC BLOOD PRESSURE: 150 MMHG | DIASTOLIC BLOOD PRESSURE: 84 MMHG | TEMPERATURE: 97.6 F | HEART RATE: 85 BPM | RESPIRATION RATE: 20 BRPM

## 2025-01-06 DIAGNOSIS — K50.90 CROHN'S DISEASE WITHOUT COMPLICATION, UNSPECIFIED GASTROINTESTINAL TRACT LOCATION: ICD-10-CM

## 2025-01-06 PROCEDURE — 96415 CHEMO IV INFUSION ADDL HR: CPT | Performed by: INTERNAL MEDICINE

## 2025-01-06 PROCEDURE — 96413 CHEMO IV INFUSION 1 HR: CPT | Performed by: INTERNAL MEDICINE

## 2025-01-06 RX ORDER — RIZATRIPTAN BENZOATE 10 MG
PRN MIGRAINES TABLET ORAL
Qty: 0 | Refills: 0 | Status: ACTIVE | COMMUNITY
Start: 1900-01-01

## 2025-01-06 RX ORDER — LEVOTHYROXINE SODIUM 0.05 MG/1
TABLET ORAL
Qty: 0 | Refills: 0 | Status: ACTIVE | COMMUNITY
Start: 1900-01-01

## 2025-01-06 RX ORDER — ATORVASTATIN CALCIUM 40 MG/1
1 TABLET TABLET, FILM COATED ORAL ONCE A DAY
Status: ACTIVE | COMMUNITY

## 2025-01-06 RX ORDER — AMITRIPTYLINE HYDROCHLORIDE 25 MG/1
TAKE 1 TABLET (25 MG) BY ORAL ROUTE ONCE DAILY AT BEDTIME TABLET, FILM COATED ORAL 1
Qty: 0 | Refills: 0 | Status: ACTIVE | COMMUNITY
Start: 1900-01-01

## 2025-01-06 RX ORDER — MESALAMINE 800 MG/1
2 TABLETS TABLET, DELAYED RELEASE ORAL TWICE A DAY
Qty: 120 | Refills: 11 | Status: ACTIVE | COMMUNITY
Start: 2024-07-20 | End: 2025-07-15

## 2025-01-06 RX ORDER — COLESEVELAM HYDROCHLORIDE 625 MG/1
3 TABLETS WITH MEALS TABLET, COATED ORAL TWICE A DAY
Qty: 540 TABLET | Refills: 3 | Status: ACTIVE | COMMUNITY
Start: 2024-09-14

## 2025-01-06 RX ORDER — INFLIXIMAB 100 MG/10ML
AS DIRECTED INJECTION, POWDER, LYOPHILIZED, FOR SOLUTION INTRAVENOUS
Status: ACTIVE | COMMUNITY
Start: 2023-12-01

## 2025-01-06 RX ORDER — FAMOTIDINE 40 MG/1
1 TABLET TABLET, FILM COATED ORAL TWICE A DAY
Qty: 180 TABLET | Refills: 3 | Status: ACTIVE | COMMUNITY
Start: 2024-03-13

## 2025-01-06 RX ORDER — FAMOTIDINE 40 MG/1
1 TABLET TABLET, FILM COATED ORAL TWICE A DAY
Qty: 180 TABLET | Refills: 3 | Status: ACTIVE | COMMUNITY
Start: 2024-09-14

## 2025-01-06 RX ORDER — COLESEVELAM HYDROCHLORIDE 625 MG/1
6 TABLETS WITH MEALS TABLET, COATED ORAL TWICE A DAY
Qty: 1080 TABLET | Refills: 3 | Status: ACTIVE | COMMUNITY
Start: 2024-06-18

## 2025-01-06 RX ORDER — METOPROLOL SUCCINATE 50 MG/1
TAKE 1 TABLET (50 MG) BY ORAL ROUTE ONCE DAILY TABLET, EXTENDED RELEASE ORAL 1
Qty: 0 | Refills: 0 | Status: ACTIVE | COMMUNITY
Start: 1900-01-01

## 2025-01-07 ENCOUNTER — OFFICE VISIT (OUTPATIENT)
Dept: URBAN - METROPOLITAN AREA CLINIC 96 | Facility: CLINIC | Age: 65
End: 2025-01-07

## 2025-02-20 ENCOUNTER — TELEPHONE ENCOUNTER (OUTPATIENT)
Dept: URBAN - METROPOLITAN AREA CLINIC 18 | Facility: CLINIC | Age: 65
End: 2025-02-20

## 2025-02-20 ENCOUNTER — OFFICE VISIT (OUTPATIENT)
Dept: URBAN - METROPOLITAN AREA CLINIC 18 | Facility: CLINIC | Age: 65
End: 2025-02-20
Payer: MEDICARE

## 2025-02-20 VITALS
HEART RATE: 94 BPM | TEMPERATURE: 98.2 F | BODY MASS INDEX: 36.12 KG/M2 | RESPIRATION RATE: 18 BRPM | DIASTOLIC BLOOD PRESSURE: 97 MMHG | SYSTOLIC BLOOD PRESSURE: 147 MMHG | WEIGHT: 184 LBS | HEIGHT: 60 IN

## 2025-02-20 DIAGNOSIS — K50.90 CROHN'S DISEASE WITHOUT COMPLICATION, UNSPECIFIED GASTROINTESTINAL TRACT LOCATION: ICD-10-CM

## 2025-02-20 PROCEDURE — 96375 TX/PRO/DX INJ NEW DRUG ADDON: CPT | Performed by: INTERNAL MEDICINE

## 2025-02-20 PROCEDURE — 96415 CHEMO IV INFUSION ADDL HR: CPT | Performed by: INTERNAL MEDICINE

## 2025-02-20 PROCEDURE — 96413 CHEMO IV INFUSION 1 HR: CPT | Performed by: INTERNAL MEDICINE

## 2025-02-20 RX ORDER — INFLIXIMAB 100 MG/10ML
AS DIRECTED INJECTION, POWDER, LYOPHILIZED, FOR SOLUTION INTRAVENOUS
Status: ACTIVE | COMMUNITY
Start: 2023-12-01

## 2025-02-20 RX ORDER — RIZATRIPTAN BENZOATE 10 MG
PRN MIGRAINES TABLET ORAL
Qty: 0 | Refills: 0 | Status: ACTIVE | COMMUNITY
Start: 1900-01-01

## 2025-02-20 RX ORDER — METOPROLOL SUCCINATE 50 MG/1
TAKE 1 TABLET (50 MG) BY ORAL ROUTE ONCE DAILY TABLET, EXTENDED RELEASE ORAL 1
Qty: 0 | Refills: 0 | Status: ACTIVE | COMMUNITY
Start: 1900-01-01

## 2025-02-20 RX ORDER — MESALAMINE 800 MG/1
2 TABLETS TABLET, DELAYED RELEASE ORAL TWICE A DAY
Qty: 120 | Refills: 11 | Status: ACTIVE | COMMUNITY
Start: 2024-07-20 | End: 2025-07-15

## 2025-02-20 RX ORDER — AMITRIPTYLINE HYDROCHLORIDE 25 MG/1
TAKE 1 TABLET (25 MG) BY ORAL ROUTE ONCE DAILY AT BEDTIME TABLET, FILM COATED ORAL 1
Qty: 0 | Refills: 0 | Status: ACTIVE | COMMUNITY
Start: 1900-01-01

## 2025-02-20 RX ORDER — COLESEVELAM HYDROCHLORIDE 625 MG/1
3 TABLETS WITH MEALS TABLET, COATED ORAL TWICE A DAY
Qty: 540 TABLET | Refills: 3 | Status: ACTIVE | COMMUNITY
Start: 2024-09-14

## 2025-02-20 RX ORDER — LEVOTHYROXINE SODIUM 0.05 MG/1
TABLET ORAL
Qty: 0 | Refills: 0 | Status: ACTIVE | COMMUNITY
Start: 1900-01-01

## 2025-02-20 RX ORDER — ATORVASTATIN CALCIUM 40 MG/1
1 TABLET TABLET, FILM COATED ORAL ONCE A DAY
Status: ACTIVE | COMMUNITY

## 2025-02-20 RX ORDER — COLESEVELAM HYDROCHLORIDE 625 MG/1
6 TABLETS WITH MEALS TABLET, COATED ORAL TWICE A DAY
Qty: 1080 TABLET | Refills: 3 | Status: ACTIVE | COMMUNITY
Start: 2024-06-18

## 2025-02-20 RX ORDER — FAMOTIDINE 40 MG/1
1 TABLET TABLET, FILM COATED ORAL TWICE A DAY
Qty: 180 TABLET | Refills: 3 | Status: ACTIVE | COMMUNITY
Start: 2024-09-14

## 2025-02-20 RX ORDER — FAMOTIDINE 40 MG/1
1 TABLET TABLET, FILM COATED ORAL TWICE A DAY
Qty: 180 TABLET | Refills: 3 | Status: ACTIVE | COMMUNITY
Start: 2024-03-13

## 2025-03-26 ENCOUNTER — WEB ENCOUNTER (OUTPATIENT)
Dept: URBAN - METROPOLITAN AREA CLINIC 96 | Facility: CLINIC | Age: 65
End: 2025-03-26

## 2025-04-03 ENCOUNTER — TELEPHONE ENCOUNTER (OUTPATIENT)
Dept: URBAN - METROPOLITAN AREA CLINIC 18 | Facility: CLINIC | Age: 65
End: 2025-04-03

## 2025-04-03 ENCOUNTER — OFFICE VISIT (OUTPATIENT)
Dept: URBAN - METROPOLITAN AREA CLINIC 18 | Facility: CLINIC | Age: 65
End: 2025-04-03
Payer: MEDICARE

## 2025-04-03 DIAGNOSIS — K50.90 CROHN DISEASE: ICD-10-CM

## 2025-04-03 PROCEDURE — 96413 CHEMO IV INFUSION 1 HR: CPT | Performed by: INTERNAL MEDICINE

## 2025-04-03 PROCEDURE — 96415 CHEMO IV INFUSION ADDL HR: CPT | Performed by: INTERNAL MEDICINE

## 2025-04-03 RX ORDER — LEVOTHYROXINE SODIUM 0.05 MG/1
TABLET ORAL
Qty: 0 | Refills: 0 | Status: ACTIVE | COMMUNITY
Start: 1900-01-01

## 2025-04-03 RX ORDER — FAMOTIDINE 40 MG/1
1 TABLET TABLET, FILM COATED ORAL TWICE A DAY
Qty: 180 TABLET | Refills: 3 | Status: ACTIVE | COMMUNITY
Start: 2024-09-14

## 2025-04-03 RX ORDER — COLESEVELAM HYDROCHLORIDE 625 MG/1
3 TABLETS WITH MEALS TABLET, COATED ORAL TWICE A DAY
Qty: 540 TABLET | Refills: 3 | Status: ACTIVE | COMMUNITY
Start: 2024-09-14

## 2025-04-03 RX ORDER — COLESEVELAM HYDROCHLORIDE 625 MG/1
6 TABLETS WITH MEALS TABLET, COATED ORAL TWICE A DAY
Qty: 1080 TABLET | Refills: 3 | Status: ACTIVE | COMMUNITY
Start: 2024-06-18

## 2025-04-03 RX ORDER — AMITRIPTYLINE HYDROCHLORIDE 25 MG/1
TAKE 1 TABLET (25 MG) BY ORAL ROUTE ONCE DAILY AT BEDTIME TABLET, FILM COATED ORAL 1
Qty: 0 | Refills: 0 | Status: ACTIVE | COMMUNITY
Start: 1900-01-01

## 2025-04-03 RX ORDER — RIZATRIPTAN BENZOATE 10 MG
PRN MIGRAINES TABLET ORAL
Qty: 0 | Refills: 0 | Status: ACTIVE | COMMUNITY
Start: 1900-01-01

## 2025-04-03 RX ORDER — METOPROLOL SUCCINATE 50 MG/1
TAKE 1 TABLET (50 MG) BY ORAL ROUTE ONCE DAILY TABLET, EXTENDED RELEASE ORAL 1
Qty: 0 | Refills: 0 | Status: ACTIVE | COMMUNITY
Start: 1900-01-01

## 2025-04-03 RX ORDER — ATORVASTATIN CALCIUM 40 MG/1
1 TABLET TABLET, FILM COATED ORAL ONCE A DAY
Status: ACTIVE | COMMUNITY

## 2025-04-03 RX ORDER — INFLIXIMAB 100 MG/10ML
AS DIRECTED INJECTION, POWDER, LYOPHILIZED, FOR SOLUTION INTRAVENOUS
Status: ACTIVE | COMMUNITY
Start: 2023-12-01

## 2025-04-03 RX ORDER — MESALAMINE 800 MG/1
2 TABLETS TABLET, DELAYED RELEASE ORAL TWICE A DAY
Qty: 120 | Refills: 11 | Status: ACTIVE | COMMUNITY
Start: 2024-07-20 | End: 2025-07-15

## 2025-04-03 RX ORDER — FAMOTIDINE 40 MG/1
1 TABLET TABLET, FILM COATED ORAL TWICE A DAY
Qty: 180 TABLET | Refills: 3 | Status: ACTIVE | COMMUNITY
Start: 2024-03-13

## 2025-04-10 ENCOUNTER — WEB ENCOUNTER (OUTPATIENT)
Dept: URBAN - METROPOLITAN AREA CLINIC 96 | Facility: CLINIC | Age: 65
End: 2025-04-10

## 2025-04-15 ENCOUNTER — WEB ENCOUNTER (OUTPATIENT)
Dept: URBAN - METROPOLITAN AREA CLINIC 96 | Facility: CLINIC | Age: 65
End: 2025-04-15

## 2025-04-16 ENCOUNTER — TELEPHONE ENCOUNTER (OUTPATIENT)
Dept: URBAN - METROPOLITAN AREA CLINIC 6 | Facility: CLINIC | Age: 65
End: 2025-04-16

## 2025-04-16 RX ORDER — HYDROCORTISONE SODIUM SUCCINATE 100 MG/2ML
AS DIRECTED INJECTION, POWDER, FOR SOLUTION INTRAMUSCULAR; INTRAVENOUS
Qty: 100 | Refills: 0 | OUTPATIENT
Start: 2025-04-17

## 2025-05-15 ENCOUNTER — OFFICE VISIT (OUTPATIENT)
Dept: URBAN - METROPOLITAN AREA CLINIC 18 | Facility: CLINIC | Age: 65
End: 2025-05-15
Payer: MEDICARE

## 2025-05-15 DIAGNOSIS — K50.90 CROHN DISEASE: ICD-10-CM

## 2025-05-15 PROCEDURE — 96413 CHEMO IV INFUSION 1 HR: CPT | Performed by: INTERNAL MEDICINE

## 2025-05-15 PROCEDURE — 96415 CHEMO IV INFUSION ADDL HR: CPT | Performed by: INTERNAL MEDICINE

## 2025-05-15 RX ORDER — AMITRIPTYLINE HYDROCHLORIDE 25 MG/1
TAKE 1 TABLET (25 MG) BY ORAL ROUTE ONCE DAILY AT BEDTIME TABLET, FILM COATED ORAL 1
Qty: 0 | Refills: 0 | Status: ACTIVE | COMMUNITY
Start: 1900-01-01

## 2025-05-15 RX ORDER — FAMOTIDINE 40 MG/1
1 TABLET TABLET, FILM COATED ORAL TWICE A DAY
Qty: 180 TABLET | Refills: 3 | Status: ACTIVE | COMMUNITY
Start: 2024-09-14

## 2025-05-15 RX ORDER — FAMOTIDINE 40 MG/1
1 TABLET TABLET, FILM COATED ORAL TWICE A DAY
Qty: 180 TABLET | Refills: 3 | Status: ACTIVE | COMMUNITY
Start: 2024-03-13

## 2025-05-15 RX ORDER — LEVOTHYROXINE SODIUM 0.05 MG/1
TABLET ORAL
Qty: 0 | Refills: 0 | Status: ACTIVE | COMMUNITY
Start: 1900-01-01

## 2025-05-15 RX ORDER — COLESEVELAM HYDROCHLORIDE 625 MG/1
6 TABLETS WITH MEALS TABLET, COATED ORAL TWICE A DAY
Qty: 1080 TABLET | Refills: 3 | Status: ACTIVE | COMMUNITY
Start: 2024-06-18

## 2025-05-15 RX ORDER — ATORVASTATIN CALCIUM 40 MG/1
1 TABLET TABLET, FILM COATED ORAL ONCE A DAY
Status: ACTIVE | COMMUNITY

## 2025-05-15 RX ORDER — RIZATRIPTAN BENZOATE 10 MG
PRN MIGRAINES TABLET ORAL
Qty: 0 | Refills: 0 | Status: ACTIVE | COMMUNITY
Start: 1900-01-01

## 2025-05-15 RX ORDER — METOPROLOL SUCCINATE 50 MG/1
TAKE 1 TABLET (50 MG) BY ORAL ROUTE ONCE DAILY TABLET, EXTENDED RELEASE ORAL 1
Qty: 0 | Refills: 0 | Status: ACTIVE | COMMUNITY
Start: 1900-01-01

## 2025-05-15 RX ORDER — INFLIXIMAB 100 MG/10ML
AS DIRECTED INJECTION, POWDER, LYOPHILIZED, FOR SOLUTION INTRAVENOUS
Status: ACTIVE | COMMUNITY
Start: 2023-12-01

## 2025-05-15 RX ORDER — COLESEVELAM HYDROCHLORIDE 625 MG/1
3 TABLETS WITH MEALS TABLET, COATED ORAL TWICE A DAY
Qty: 540 TABLET | Refills: 3 | Status: ACTIVE | COMMUNITY
Start: 2024-09-14

## 2025-05-15 RX ORDER — MESALAMINE 800 MG/1
2 TABLETS TABLET, DELAYED RELEASE ORAL TWICE A DAY
Qty: 120 | Refills: 11 | Status: ACTIVE | COMMUNITY
Start: 2024-07-20 | End: 2025-07-15

## 2025-05-15 RX ORDER — HYDROCORTISONE SODIUM SUCCINATE 100 MG/2ML
AS DIRECTED INJECTION, POWDER, FOR SOLUTION INTRAMUSCULAR; INTRAVENOUS
Qty: 100 | Refills: 0 | Status: ACTIVE | COMMUNITY
Start: 2025-04-17

## 2025-05-20 ENCOUNTER — OFFICE VISIT (OUTPATIENT)
Dept: URBAN - METROPOLITAN AREA CLINIC 96 | Facility: CLINIC | Age: 65
End: 2025-05-20

## 2025-06-26 ENCOUNTER — OFFICE VISIT (OUTPATIENT)
Dept: URBAN - METROPOLITAN AREA CLINIC 18 | Facility: CLINIC | Age: 65
End: 2025-06-26
Payer: MEDICARE

## 2025-06-26 DIAGNOSIS — K50.90 CROHN'S DISEASE WITHOUT COMPLICATION, UNSPECIFIED GASTROINTESTINAL TRACT LOCATION: ICD-10-CM

## 2025-06-26 PROCEDURE — 96413 CHEMO IV INFUSION 1 HR: CPT | Performed by: INTERNAL MEDICINE

## 2025-06-26 PROCEDURE — 96415 CHEMO IV INFUSION ADDL HR: CPT | Performed by: INTERNAL MEDICINE

## 2025-06-26 RX ORDER — ATORVASTATIN CALCIUM 40 MG/1
1 TABLET TABLET, FILM COATED ORAL ONCE A DAY
Status: ACTIVE | COMMUNITY

## 2025-06-26 RX ORDER — COLESEVELAM HYDROCHLORIDE 625 MG/1
6 TABLETS WITH MEALS TABLET, COATED ORAL TWICE A DAY
Qty: 1080 TABLET | Refills: 3 | Status: ACTIVE | COMMUNITY
Start: 2024-06-18

## 2025-06-26 RX ORDER — MESALAMINE 800 MG/1
2 TABLETS TABLET, DELAYED RELEASE ORAL TWICE A DAY
Qty: 120 | Refills: 11 | Status: ACTIVE | COMMUNITY
Start: 2024-07-20 | End: 2025-07-15

## 2025-06-26 RX ORDER — FAMOTIDINE 40 MG/1
1 TABLET TABLET, FILM COATED ORAL TWICE A DAY
Qty: 180 TABLET | Refills: 3 | Status: ACTIVE | COMMUNITY
Start: 2024-09-14

## 2025-06-26 RX ORDER — AMITRIPTYLINE HYDROCHLORIDE 25 MG/1
TAKE 1 TABLET (25 MG) BY ORAL ROUTE ONCE DAILY AT BEDTIME TABLET, FILM COATED ORAL 1
Qty: 0 | Refills: 0 | Status: ACTIVE | COMMUNITY
Start: 1900-01-01

## 2025-06-26 RX ORDER — METOPROLOL SUCCINATE 50 MG/1
TAKE 1 TABLET (50 MG) BY ORAL ROUTE ONCE DAILY TABLET, EXTENDED RELEASE ORAL 1
Qty: 0 | Refills: 0 | Status: ACTIVE | COMMUNITY
Start: 1900-01-01

## 2025-06-26 RX ORDER — HYDROCORTISONE SODIUM SUCCINATE 100 MG/2ML
AS DIRECTED INJECTION, POWDER, FOR SOLUTION INTRAMUSCULAR; INTRAVENOUS
Qty: 100 | Refills: 0 | Status: ACTIVE | COMMUNITY
Start: 2025-04-17

## 2025-06-26 RX ORDER — FAMOTIDINE 40 MG/1
1 TABLET TABLET, FILM COATED ORAL TWICE A DAY
Qty: 180 TABLET | Refills: 3 | Status: ACTIVE | COMMUNITY
Start: 2024-03-13

## 2025-06-26 RX ORDER — INFLIXIMAB 100 MG/10ML
AS DIRECTED INJECTION, POWDER, LYOPHILIZED, FOR SOLUTION INTRAVENOUS
Status: ACTIVE | COMMUNITY
Start: 2023-12-01

## 2025-06-26 RX ORDER — RIZATRIPTAN BENZOATE 10 MG
PRN MIGRAINES TABLET ORAL
Qty: 0 | Refills: 0 | Status: ACTIVE | COMMUNITY
Start: 1900-01-01

## 2025-06-26 RX ORDER — LEVOTHYROXINE SODIUM 0.05 MG/1
TABLET ORAL
Qty: 0 | Refills: 0 | Status: ACTIVE | COMMUNITY
Start: 1900-01-01

## 2025-06-26 RX ORDER — COLESEVELAM HYDROCHLORIDE 625 MG/1
3 TABLETS WITH MEALS TABLET, COATED ORAL TWICE A DAY
Qty: 540 TABLET | Refills: 3 | Status: ACTIVE | COMMUNITY
Start: 2024-09-14

## 2025-08-04 ENCOUNTER — OFFICE VISIT (OUTPATIENT)
Dept: URBAN - METROPOLITAN AREA CLINIC 18 | Facility: CLINIC | Age: 65
End: 2025-08-04
Payer: MEDICARE

## 2025-08-04 DIAGNOSIS — K50.90 CROHNS DISEASE: ICD-10-CM

## 2025-08-04 PROCEDURE — 96415 CHEMO IV INFUSION ADDL HR: CPT | Performed by: INTERNAL MEDICINE

## 2025-08-04 PROCEDURE — 96413 CHEMO IV INFUSION 1 HR: CPT | Performed by: INTERNAL MEDICINE

## 2025-08-04 RX ORDER — RIZATRIPTAN BENZOATE 10 MG
PRN MIGRAINES TABLET ORAL
Qty: 0 | Refills: 0 | Status: ACTIVE | COMMUNITY
Start: 1900-01-01

## 2025-08-04 RX ORDER — ATORVASTATIN CALCIUM 40 MG/1
1 TABLET TABLET, FILM COATED ORAL ONCE A DAY
Status: ACTIVE | COMMUNITY

## 2025-08-04 RX ORDER — FAMOTIDINE 40 MG/1
1 TABLET TABLET, FILM COATED ORAL TWICE A DAY
Qty: 180 TABLET | Refills: 3 | Status: ACTIVE | COMMUNITY
Start: 2024-09-14

## 2025-08-04 RX ORDER — COLESEVELAM HYDROCHLORIDE 625 MG/1
6 TABLETS WITH MEALS TABLET, COATED ORAL TWICE A DAY
Qty: 1080 TABLET | Refills: 3 | Status: ACTIVE | COMMUNITY
Start: 2024-06-18

## 2025-08-04 RX ORDER — HYDROCORTISONE SODIUM SUCCINATE 100 MG/2ML
AS DIRECTED INJECTION, POWDER, FOR SOLUTION INTRAMUSCULAR; INTRAVENOUS
Qty: 100 | Refills: 0 | Status: ACTIVE | COMMUNITY
Start: 2025-04-17

## 2025-08-04 RX ORDER — COLESEVELAM HYDROCHLORIDE 625 MG/1
3 TABLETS WITH MEALS TABLET, COATED ORAL TWICE A DAY
Qty: 540 TABLET | Refills: 3 | Status: ACTIVE | COMMUNITY
Start: 2024-09-14

## 2025-08-04 RX ORDER — INFLIXIMAB 100 MG/10ML
AS DIRECTED INJECTION, POWDER, LYOPHILIZED, FOR SOLUTION INTRAVENOUS
Status: ACTIVE | COMMUNITY
Start: 2023-12-01

## 2025-08-04 RX ORDER — FAMOTIDINE 40 MG/1
1 TABLET TABLET, FILM COATED ORAL TWICE A DAY
Qty: 180 TABLET | Refills: 3 | Status: ACTIVE | COMMUNITY
Start: 2024-03-13

## 2025-08-04 RX ORDER — METOPROLOL SUCCINATE 50 MG/1
TAKE 1 TABLET (50 MG) BY ORAL ROUTE ONCE DAILY TABLET, EXTENDED RELEASE ORAL 1
Qty: 0 | Refills: 0 | Status: ACTIVE | COMMUNITY
Start: 1900-01-01

## 2025-08-04 RX ORDER — AMITRIPTYLINE HYDROCHLORIDE 25 MG/1
TAKE 1 TABLET (25 MG) BY ORAL ROUTE ONCE DAILY AT BEDTIME TABLET, FILM COATED ORAL 1
Qty: 0 | Refills: 0 | Status: ACTIVE | COMMUNITY
Start: 1900-01-01

## 2025-08-04 RX ORDER — LEVOTHYROXINE SODIUM 0.05 MG/1
TABLET ORAL
Qty: 0 | Refills: 0 | Status: ACTIVE | COMMUNITY
Start: 1900-01-01

## 2025-08-20 ENCOUNTER — TELEPHONE ENCOUNTER (OUTPATIENT)
Dept: URBAN - METROPOLITAN AREA CLINIC 96 | Facility: CLINIC | Age: 65
End: 2025-08-20

## 2025-08-20 ENCOUNTER — OFFICE VISIT (OUTPATIENT)
Dept: URBAN - METROPOLITAN AREA CLINIC 96 | Facility: CLINIC | Age: 65
End: 2025-08-20

## 2025-08-20 RX ORDER — AMITRIPTYLINE HYDROCHLORIDE 25 MG/1
TAKE 1 TABLET (25 MG) BY ORAL ROUTE ONCE DAILY AT BEDTIME TABLET, FILM COATED ORAL 1
Qty: 0 | Refills: 0 | Status: ACTIVE | COMMUNITY
Start: 1900-01-01

## 2025-08-20 RX ORDER — FAMOTIDINE 40 MG/1
1 TABLET TABLET, FILM COATED ORAL TWICE A DAY
Qty: 180 TABLET | Refills: 3 | OUTPATIENT
Start: 2025-08-20

## 2025-08-20 RX ORDER — HYDROCORTISONE SODIUM SUCCINATE 100 MG/2ML
AS DIRECTED INJECTION, POWDER, FOR SOLUTION INTRAMUSCULAR; INTRAVENOUS
Qty: 100 | Refills: 0 | Status: ACTIVE | COMMUNITY
Start: 2025-04-17

## 2025-08-20 RX ORDER — COLESEVELAM HYDROCHLORIDE 625 MG/1
2 TABLETS WITH MEALS TABLET, COATED ORAL
Qty: 540 TABLET | Refills: 3 | OUTPATIENT
Start: 2025-08-20

## 2025-08-20 RX ORDER — COLESEVELAM HYDROCHLORIDE 625 MG/1
6 TABLETS WITH MEALS TABLET, COATED ORAL TWICE A DAY
Qty: 1080 TABLET | Refills: 3 | Status: ACTIVE | COMMUNITY
Start: 2024-06-18

## 2025-08-20 RX ORDER — FAMOTIDINE 40 MG/1
1 TABLET TABLET, FILM COATED ORAL TWICE A DAY
Qty: 180 TABLET | Refills: 3 | Status: ACTIVE | COMMUNITY
Start: 2024-03-13

## 2025-08-20 RX ORDER — RIZATRIPTAN BENZOATE 10 MG
PRN MIGRAINES TABLET ORAL
Qty: 0 | Refills: 0 | Status: ACTIVE | COMMUNITY
Start: 1900-01-01

## 2025-08-20 RX ORDER — LEVOTHYROXINE SODIUM 0.05 MG/1
TABLET ORAL
Qty: 0 | Refills: 0 | Status: ACTIVE | COMMUNITY
Start: 1900-01-01

## 2025-08-20 RX ORDER — ATORVASTATIN CALCIUM 40 MG/1
1 TABLET TABLET, FILM COATED ORAL ONCE A DAY
Status: ACTIVE | COMMUNITY

## 2025-08-20 RX ORDER — METOPROLOL SUCCINATE 50 MG/1
TAKE 1 TABLET (50 MG) BY ORAL ROUTE ONCE DAILY TABLET, EXTENDED RELEASE ORAL 1
Qty: 0 | Refills: 0 | Status: ACTIVE | COMMUNITY
Start: 1900-01-01